# Patient Record
Sex: MALE | Race: BLACK OR AFRICAN AMERICAN | Employment: UNEMPLOYED | URBAN - METROPOLITAN AREA
[De-identification: names, ages, dates, MRNs, and addresses within clinical notes are randomized per-mention and may not be internally consistent; named-entity substitution may affect disease eponyms.]

---

## 2019-04-01 ASSESSMENT — ENCOUNTER SYMPTOMS
ABDOMINAL PAIN: 0
SINUS PAIN: 0
BLOOD IN STOOL: 0
VOMITING: 0
CONSTIPATION: 0
SHORTNESS OF BREATH: 0
WHEEZING: 0
DIARRHEA: 0
SORE THROAT: 0
NAUSEA: 0
BACK PAIN: 0
COUGH: 0

## 2019-04-01 NOTE — PROGRESS NOTES
New Patient Office Visit  4/4/2019    Subjective:  Chief Complaint   Patient presents with   Sarah Paredes Doctor     Was seeing Dr. Shyam Curran      HPI:  Delores Loya is a 62 y.o. male who presents to the clinic today to establish care. HTN- Taking lisinopril 20 mg daily without side effects. Started with this medication 2 years ago. Asymptomatic. No chest pain, palpitations, shortness of breath, trouble breathing, lightheadedness, dizziness or blurred vision. Prediabetes- pt reports he has never been on medications for this. He states that he enjoys sweets. Reports he had a colonoscopy 10/18/17 years ago with 8 polyps removed. Repeat recommended in 3 years. No acute concerns. 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 He has family in town. 2 children. For fun, he plays with his grandchildren- 5 of them. 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 Review of Systems   Constitutional: Negative for chills, fatigue, fever and unexpected weight change. HENT: Negative for congestion, postnasal drip, sinus pain, sore throat and tinnitus. Respiratory: Negative for cough, shortness of breath and wheezing. Cardiovascular: Negative for chest pain, palpitations and leg swelling. Gastrointestinal: Negative for abdominal pain, blood in stool, constipation, diarrhea, nausea and vomiting. Genitourinary: Negative for dysuria, frequency, hematuria and urgency. Musculoskeletal: Negative for back pain, gait problem, myalgias and neck pain. Skin: Negative for pallor and rash. Neurological: Negative for dizziness, weakness, light-headedness, numbness and headaches. Psychiatric/Behavioral: Negative for behavioral problems, confusion, sleep disturbance and suicidal ideas. The patient is not nervous/anxious.       No Known Allergies     Family History   Problem Relation Age of Onset    High Blood Pressure Mother     High Blood Pressure Father     High Blood Pressure Sister     High Blood Pressure Sister     Heart Attack Neg Hx     Stroke Neg Hx     Prostate Cancer Neg Hx      Social History     Socioeconomic History    Marital status: Single     Spouse name: Not on file    Number of children: Not on file    Years of education: Not on file    Highest education level: Not on file   Occupational History    Not on file   Social Needs    Financial resource strain: Not on file    Food insecurity:     Worry: Not on file     Inability: Not on file    Transportation needs:     Medical: Not on file     Non-medical: Not on file   Tobacco Use    Smoking status: Never Smoker    Smokeless tobacco: Never Used   Substance and Sexual Activity    Alcohol use: Not Currently     Comment: has not drank in 3 years- before that, social    Drug use: Yes     Types: Marijuana     Comment: 2x/day    Sexual activity: Not on file     Comment: single    Lifestyle    Physical activity:     Days per week: Not on file     Minutes per session: Not on file    Stress: Not on file   Relationships    Social connections:     Talks on phone: Not on file     Gets together: Not on file     Attends Alevism service: Not on file     Active member of club or organization: Not on file     Attends meetings of clubs or organizations: Not on file     Relationship status: Not on file    Intimate partner violence:     Fear of current or ex partner: Not on file     Emotionally abused: Not on file     Physically abused: Not on file     Forced sexual activity: Not on file   Other Topics Concern    Not on file   Social History Narrative    He is working at a plant in [employer]. He has family in town. 2 children. For fun, he plays with his grandchildren- 5 of them.      Past Medical History:   Diagnosis Date    Hypertension     Prediabetes      Current Outpatient Medications   Medication Sig Dispense Refill    lisinopril (PRINIVIL;ZESTRIL) 20 MG tablet TK 1 T PO QD  4     No current facility-administered hypertension  -     The patient reports a history of hypertension. He is taking lisinopril 20 mg by mouth daily without side effects. Asymptomatic. No chest pain, palpitations, shortness of breath, trouble breathing, lightheadedness, dizziness or blurred vision.  - Patient denies the need for refill today. - CBC Auto Differential; Future  -     COMPREHENSIVE METABOLIC PANEL; Future    Prediabetes  -    Pt reports he has never been on medications for prediabetes. Asymptomatic.   - Lifestyle modifications such as exercise, weight loss and healthy diet encouraged and reviewed with the pt. -  Will re-evaluate today. - CBC Auto Differential; Future  -     COMPREHENSIVE METABOLIC PANEL; Future  -     HEMOGLOBIN A1C; Future    Screening for HIV (human immunodeficiency virus)  -     HIV Screen; Future    Need for hepatitis C screening test  -     HEPATITIS C ANTIBODY; Future    Screening, lipid  -     Lipid, Fasting; Future    Signed release for immunizations. Return in about 3 months (around 7/4/2019) for BP/prediabetes f/u , or sooner if needed. Pt will call if symptoms worsen or fail to improve. All questions answered. Pt states no further questions or concerns at this time.    Electronically signed by: Nika Rodriguez 04/04/19

## 2019-04-04 ENCOUNTER — OFFICE VISIT (OUTPATIENT)
Dept: INTERNAL MEDICINE CLINIC | Age: 59
End: 2019-04-04
Payer: COMMERCIAL

## 2019-04-04 VITALS
SYSTOLIC BLOOD PRESSURE: 128 MMHG | BODY MASS INDEX: 30.86 KG/M2 | HEIGHT: 66 IN | DIASTOLIC BLOOD PRESSURE: 86 MMHG | WEIGHT: 192 LBS | HEART RATE: 80 BPM

## 2019-04-04 DIAGNOSIS — Z11.59 NEED FOR HEPATITIS C SCREENING TEST: ICD-10-CM

## 2019-04-04 DIAGNOSIS — Z11.4 SCREENING FOR HIV (HUMAN IMMUNODEFICIENCY VIRUS): ICD-10-CM

## 2019-04-04 DIAGNOSIS — Z76.89 ENCOUNTER TO ESTABLISH CARE WITH NEW DOCTOR: Primary | ICD-10-CM

## 2019-04-04 DIAGNOSIS — R73.03 PREDIABETES: ICD-10-CM

## 2019-04-04 DIAGNOSIS — I10 ESSENTIAL HYPERTENSION: ICD-10-CM

## 2019-04-04 DIAGNOSIS — Z13.220 SCREENING, LIPID: ICD-10-CM

## 2019-04-04 PROCEDURE — 99386 PREV VISIT NEW AGE 40-64: CPT | Performed by: NURSE PRACTITIONER

## 2019-04-04 RX ORDER — LISINOPRIL 20 MG/1
TABLET ORAL
Refills: 4 | COMMUNITY
Start: 2019-03-05 | End: 2019-06-28 | Stop reason: SDUPTHER

## 2019-04-04 ASSESSMENT — PATIENT HEALTH QUESTIONNAIRE - PHQ9
SUM OF ALL RESPONSES TO PHQ QUESTIONS 1-9: 0
SUM OF ALL RESPONSES TO PHQ QUESTIONS 1-9: 0
SUM OF ALL RESPONSES TO PHQ9 QUESTIONS 1 & 2: 0
2. FEELING DOWN, DEPRESSED OR HOPELESS: 0
1. LITTLE INTEREST OR PLEASURE IN DOING THINGS: 0

## 2019-04-04 NOTE — PATIENT INSTRUCTIONS
Please get your fasting lab work (no food or drink for 10-12 hours prior besides water) completed M-F 830a-430p at our office. [facility] lab has walk-in hours available as well - they are open Saturday 7a-3p - no appointment is needed. We will call with your results.

## 2019-06-27 ASSESSMENT — ENCOUNTER SYMPTOMS
CONSTIPATION: 0
WHEEZING: 0
VOMITING: 0
NAUSEA: 0
DIARRHEA: 0
SHORTNESS OF BREATH: 0
COUGH: 0
ABDOMINAL PAIN: 0

## 2019-06-27 NOTE — PROGRESS NOTES
Office Visit   6/28/2019    Subjective:  Chief Complaint   Patient presents with    Hypertension     HPI:  Clarine Lesch is a 62 y.o. male who presents to the clinic today for follow up. HTN- Taking lisinopril 20 mg daily without side effects. Does not take his BP at home. Asymptomatic. No chest pain, palpitations, shortness of breath, trouble breathing, lightheadedness, dizziness or blurred vision.     Prediabetes- pt reports he has never been on medications for this. He states that he enjoys sweets. He states a poor diet- enjoys cookies, pop and ice cream. Blood work was never completed by the pt since last visit. Will evaluate with POCT A1C today. His daughter has 5 sons and is pregnant with quadruplets. Review of Systems   Constitutional: Negative for chills, fatigue, fever and unexpected weight change. Eyes: Negative for visual disturbance. Respiratory: Negative for cough, shortness of breath and wheezing. Cardiovascular: Negative for chest pain, palpitations and leg swelling. Gastrointestinal: Negative for abdominal pain, constipation, diarrhea, nausea and vomiting. Skin: Negative for pallor and rash. Neurological: Negative for dizziness, weakness, light-headedness, numbness and headaches. No Known Allergies    Current Outpatient Rx   Medication Sig Dispense Refill    lisinopril (PRINIVIL;ZESTRIL) 20 MG tablet Take one tablet by mouth daily 90 tablet 0     Patient Active Problem List   Diagnosis    Hypertension    Prediabetes     Wt Readings from Last 3 Encounters:   06/28/19 192 lb (87.1 kg)   04/04/19 192 lb (87.1 kg)     BP Readings from Last 3 Encounters:   06/28/19 125/84   04/04/19 128/86     The ASCVD Risk score (Isa Quick., et al., 2013) failed to calculate for the following reasons:    Cannot find a previous HDL lab    Cannot find a previous total cholesterol lab   ^ordered.     Objective/Physical Exam:  /84   Pulse 83   Ht 5' 6\" (1.676 m)   Wt 192 lb (87.1 kg) agreeable to the plan. Return in about 3 months (around 9/28/2019) for BP f/u, or sooner if needed. Pt will call if symptoms worsen or fail to improve. All questions answered. Pt states no further questions or concerns at this time.    Electronically signed by: Justine Nicolas 06/28/19

## 2019-06-28 ENCOUNTER — OFFICE VISIT (OUTPATIENT)
Dept: INTERNAL MEDICINE CLINIC | Age: 59
End: 2019-06-28
Payer: COMMERCIAL

## 2019-06-28 VITALS
SYSTOLIC BLOOD PRESSURE: 125 MMHG | DIASTOLIC BLOOD PRESSURE: 84 MMHG | HEART RATE: 83 BPM | WEIGHT: 192 LBS | HEIGHT: 66 IN | BODY MASS INDEX: 30.86 KG/M2

## 2019-06-28 DIAGNOSIS — I10 ESSENTIAL HYPERTENSION: Primary | ICD-10-CM

## 2019-06-28 DIAGNOSIS — R73.03 PREDIABETES: ICD-10-CM

## 2019-06-28 LAB — HBA1C MFR BLD: 5.6 %

## 2019-06-28 PROCEDURE — 3017F COLORECTAL CA SCREEN DOC REV: CPT | Performed by: NURSE PRACTITIONER

## 2019-06-28 PROCEDURE — 83036 HEMOGLOBIN GLYCOSYLATED A1C: CPT | Performed by: NURSE PRACTITIONER

## 2019-06-28 PROCEDURE — G8427 DOCREV CUR MEDS BY ELIG CLIN: HCPCS | Performed by: NURSE PRACTITIONER

## 2019-06-28 PROCEDURE — G8417 CALC BMI ABV UP PARAM F/U: HCPCS | Performed by: NURSE PRACTITIONER

## 2019-06-28 PROCEDURE — 99213 OFFICE O/P EST LOW 20 MIN: CPT | Performed by: NURSE PRACTITIONER

## 2019-06-28 PROCEDURE — 1036F TOBACCO NON-USER: CPT | Performed by: NURSE PRACTITIONER

## 2019-06-28 RX ORDER — LISINOPRIL 20 MG/1
TABLET ORAL
Qty: 90 TABLET | Refills: 0 | Status: SHIPPED | OUTPATIENT
Start: 2019-06-28 | End: 2019-11-14 | Stop reason: SDUPTHER

## 2019-06-28 NOTE — PATIENT INSTRUCTIONS
Please get your fasting lab work (no food or drink for 10-12 hours prior besides water) completed M-F 830a-430p at our office. [facility] lab has walk-in hours available as well - they are open Saturday 7a-3p - no appointment is needed. We will call with your results.

## 2019-11-14 ENCOUNTER — OFFICE VISIT (OUTPATIENT)
Dept: INTERNAL MEDICINE CLINIC | Age: 59
End: 2019-11-14
Payer: COMMERCIAL

## 2019-11-14 VITALS
SYSTOLIC BLOOD PRESSURE: 128 MMHG | BODY MASS INDEX: 30.51 KG/M2 | HEART RATE: 84 BPM | WEIGHT: 189 LBS | OXYGEN SATURATION: 98 % | TEMPERATURE: 98.3 F | DIASTOLIC BLOOD PRESSURE: 82 MMHG

## 2019-11-14 DIAGNOSIS — S79.912A HIP INJURY, LEFT, INITIAL ENCOUNTER: ICD-10-CM

## 2019-11-14 DIAGNOSIS — Z13.220 SCREENING, LIPID: ICD-10-CM

## 2019-11-14 DIAGNOSIS — Z23 NEED FOR INFLUENZA VACCINATION: ICD-10-CM

## 2019-11-14 DIAGNOSIS — I10 ESSENTIAL HYPERTENSION: Primary | ICD-10-CM

## 2019-11-14 DIAGNOSIS — Z11.59 NEED FOR HEPATITIS C SCREENING TEST: ICD-10-CM

## 2019-11-14 DIAGNOSIS — Z11.4 SCREENING FOR HIV (HUMAN IMMUNODEFICIENCY VIRUS): ICD-10-CM

## 2019-11-14 DIAGNOSIS — R73.03 PREDIABETES: ICD-10-CM

## 2019-11-14 PROCEDURE — G8427 DOCREV CUR MEDS BY ELIG CLIN: HCPCS | Performed by: NURSE PRACTITIONER

## 2019-11-14 PROCEDURE — 90471 IMMUNIZATION ADMIN: CPT | Performed by: NURSE PRACTITIONER

## 2019-11-14 PROCEDURE — G8417 CALC BMI ABV UP PARAM F/U: HCPCS | Performed by: NURSE PRACTITIONER

## 2019-11-14 PROCEDURE — 99213 OFFICE O/P EST LOW 20 MIN: CPT | Performed by: NURSE PRACTITIONER

## 2019-11-14 PROCEDURE — 3017F COLORECTAL CA SCREEN DOC REV: CPT | Performed by: NURSE PRACTITIONER

## 2019-11-14 PROCEDURE — 90756 CCIIV4 VACC ABX FREE IM: CPT | Performed by: NURSE PRACTITIONER

## 2019-11-14 PROCEDURE — 1036F TOBACCO NON-USER: CPT | Performed by: NURSE PRACTITIONER

## 2019-11-14 PROCEDURE — G8482 FLU IMMUNIZE ORDER/ADMIN: HCPCS | Performed by: NURSE PRACTITIONER

## 2019-11-14 RX ORDER — LISINOPRIL 20 MG/1
TABLET ORAL
Qty: 90 TABLET | Refills: 0 | Status: SHIPPED | OUTPATIENT
Start: 2019-11-14 | End: 2020-02-21 | Stop reason: SDUPTHER

## 2019-11-14 ASSESSMENT — ENCOUNTER SYMPTOMS
VOMITING: 0
SHORTNESS OF BREATH: 0
WHEEZING: 0
COUGH: 0
ABDOMINAL PAIN: 0
DIARRHEA: 0
NAUSEA: 0
CONSTIPATION: 0

## 2019-11-15 ENCOUNTER — HOSPITAL ENCOUNTER (OUTPATIENT)
Dept: GENERAL RADIOLOGY | Age: 59
Discharge: HOME | End: 2019-11-15
Payer: COMMERCIAL

## 2019-11-15 ENCOUNTER — HOSPITAL ENCOUNTER (OUTPATIENT)
Age: 59
Discharge: HOME | End: 2019-11-15
Payer: COMMERCIAL

## 2019-11-15 DIAGNOSIS — S79.912A HIP INJURY, LEFT, INITIAL ENCOUNTER: ICD-10-CM

## 2019-11-15 PROCEDURE — 73502 X-RAY EXAM HIP UNI 2-3 VIEWS: CPT

## 2019-12-05 ENCOUNTER — TELEPHONE (OUTPATIENT)
Dept: INTERNAL MEDICINE CLINIC | Age: 59
End: 2019-12-05

## 2020-02-24 RX ORDER — LISINOPRIL 20 MG/1
TABLET ORAL
Qty: 30 TABLET | Refills: 0 | Status: SHIPPED | OUTPATIENT
Start: 2020-02-24 | End: 2020-06-12 | Stop reason: SDUPTHER

## 2020-02-24 NOTE — TELEPHONE ENCOUNTER
Patient requesting a medication refill. (patients insurance is no longer accepted by [facility])  Medication: lisinopril (PRINIVIL;ZESTRIL) 20 MG tablet   Pharmacy: [facility] , [address]  Last office visit: 11/14/2019  Next office visit: 3/13/2020

## 2020-03-05 ASSESSMENT — ENCOUNTER SYMPTOMS
COUGH: 0
VOMITING: 0
CONSTIPATION: 0
SHORTNESS OF BREATH: 0
ABDOMINAL PAIN: 0
DIARRHEA: 0
NAUSEA: 0
WHEEZING: 0

## 2020-03-13 ENCOUNTER — OFFICE VISIT (OUTPATIENT)
Dept: INTERNAL MEDICINE CLINIC | Age: 60
End: 2020-03-13
Payer: COMMERCIAL

## 2020-03-13 VITALS
SYSTOLIC BLOOD PRESSURE: 132 MMHG | OXYGEN SATURATION: 98 % | DIASTOLIC BLOOD PRESSURE: 84 MMHG | WEIGHT: 188 LBS | BODY MASS INDEX: 30.34 KG/M2 | HEART RATE: 87 BPM

## 2020-03-13 PROCEDURE — 3017F COLORECTAL CA SCREEN DOC REV: CPT | Performed by: NURSE PRACTITIONER

## 2020-03-13 PROCEDURE — 1036F TOBACCO NON-USER: CPT | Performed by: NURSE PRACTITIONER

## 2020-03-13 PROCEDURE — G8482 FLU IMMUNIZE ORDER/ADMIN: HCPCS | Performed by: NURSE PRACTITIONER

## 2020-03-13 PROCEDURE — G8427 DOCREV CUR MEDS BY ELIG CLIN: HCPCS | Performed by: NURSE PRACTITIONER

## 2020-03-13 PROCEDURE — 99213 OFFICE O/P EST LOW 20 MIN: CPT | Performed by: NURSE PRACTITIONER

## 2020-03-13 PROCEDURE — G8417 CALC BMI ABV UP PARAM F/U: HCPCS | Performed by: NURSE PRACTITIONER

## 2020-03-13 RX ORDER — LISINOPRIL 20 MG/1
TABLET ORAL
Qty: 30 TABLET | Refills: 0 | Status: CANCELLED | OUTPATIENT
Start: 2020-03-13

## 2020-03-13 SDOH — ECONOMIC STABILITY: TRANSPORTATION INSECURITY
IN THE PAST 12 MONTHS, HAS THE LACK OF TRANSPORTATION KEPT YOU FROM MEDICAL APPOINTMENTS OR FROM GETTING MEDICATIONS?: NO

## 2020-03-13 SDOH — ECONOMIC STABILITY: FOOD INSECURITY: WITHIN THE PAST 12 MONTHS, YOU WORRIED THAT YOUR FOOD WOULD RUN OUT BEFORE YOU GOT MONEY TO BUY MORE.: NEVER TRUE

## 2020-03-13 SDOH — ECONOMIC STABILITY: INCOME INSECURITY: HOW HARD IS IT FOR YOU TO PAY FOR THE VERY BASICS LIKE FOOD, HOUSING, MEDICAL CARE, AND HEATING?: NOT HARD AT ALL

## 2020-03-13 SDOH — ECONOMIC STABILITY: TRANSPORTATION INSECURITY
IN THE PAST 12 MONTHS, HAS LACK OF TRANSPORTATION KEPT YOU FROM MEETINGS, WORK, OR FROM GETTING THINGS NEEDED FOR DAILY LIVING?: NO

## 2020-03-13 SDOH — ECONOMIC STABILITY: FOOD INSECURITY: WITHIN THE PAST 12 MONTHS, THE FOOD YOU BOUGHT JUST DIDN'T LAST AND YOU DIDN'T HAVE MONEY TO GET MORE.: NEVER TRUE

## 2020-03-13 ASSESSMENT — PATIENT HEALTH QUESTIONNAIRE - PHQ9
SUM OF ALL RESPONSES TO PHQ QUESTIONS 1-9: 0
2. FEELING DOWN, DEPRESSED OR HOPELESS: 0
SUM OF ALL RESPONSES TO PHQ9 QUESTIONS 1 & 2: 0
SUM OF ALL RESPONSES TO PHQ QUESTIONS 1-9: 0
1. LITTLE INTEREST OR PLEASURE IN DOING THINGS: 0

## 2020-05-18 ENCOUNTER — TELEPHONE (OUTPATIENT)
Dept: INTERNAL MEDICINE CLINIC | Age: 60
End: 2020-05-18

## 2020-05-26 ENCOUNTER — HOSPITAL ENCOUNTER (OUTPATIENT)
Dept: PHYSICAL THERAPY | Age: 60
Setting detail: THERAPIES SERIES
Discharge: HOME | End: 2020-05-26
Payer: COMMERCIAL

## 2020-05-26 PROCEDURE — 97161 PT EVAL LOW COMPLEX 20 MIN: CPT

## 2020-05-26 PROCEDURE — G0283 ELEC STIM OTHER THAN WOUND: HCPCS

## 2020-05-26 PROCEDURE — 97110 THERAPEUTIC EXERCISES: CPT

## 2020-05-26 NOTE — FLOWSHEET NOTE
Program:  Access Code: [email or web address]   URL: [email or web address]   Date: 05/26/2020   Prepared by: Tyler Horta     Exercises   Supine Bridge - 10 reps - 3 sets - 2x daily - 7x weekly   Clamshell with Resistance - 10 reps - 3 sets - 2x daily - 7x weekly   Squat - 10 reps - 3 sets - 2x daily - 7x weekly   Supine Active Straight Leg Raise - 10 reps - 3 sets - 2x daily - 7x weekly   Sidelying Hip Abduction - 10 reps - 3 sets - 2x daily - 7x weekly       Therapeutic Exercise and NMR EXR  [] (08972) Provided verbal/tactile cueing for activities related to strengthening, flexibility, endurance, ROM for improvements in LE, proximal hip, and core control with self care, mobility, lifting, ambulation.  [] (53577) Provided verbal/tactile cueing for activities related to improving balance, coordination, kinesthetic sense, posture, motor skill, proprioception  to assist with LE, proximal hip, and core control in self care, mobility, lifting, ambulation and eccentric single leg control.   [] (20348) Therapist is in constant attendance of 2 or more patients providing skilled therapy interventions, but not providing any significant amount of measurable one-on-one time to either patient, for improvements in LE, proximal hip, and core control in self care, mobility, lifting, ambulation and eccentric single leg control.      NMR and Therapeutic Activities:    [] (68244 or 84703) Provided verbal/tactile cueing for activities related to improving balance, coordination, kinesthetic sense, posture, motor skill, proprioception and motor activation to allow for proper function of core, proximal hip and LE with self care and ADLs  [] (73460) Gait Re-education- Provided training and instruction to the patient for proper LE, core and proximal hip recruitment and positioning and eccentric body weight control with ambulation re-education including up and down stairs     Home Exercise Program:    [x] (75497) Reviewed/Progressed HEP activities related to strengthening, flexibility, endurance, ROM of core, proximal hip and LE for functional self-care, mobility, lifting and ambulation/stair navigation   [] (29249)Reviewed/Progressed HEP activities related to improving balance, coordination, kinesthetic sense, posture, motor skill, proprioception of core, proximal hip and LE for self care, mobility, lifting, and ambulation/stair navigation      Manual Treatments:  PROM / STM / Oscillations-Mobs:  G-I, II, III, IV (PA's, Inf., Post.)  [] (10678) Provided manual therapy to mobilize LE, proximal hip and/or LS spine soft tissue/joints for the purpose of modulating pain, promoting relaxation,  increasing ROM, reducing/eliminating soft tissue swelling/inflammation/restriction, improving soft tissue extensibility and allowing for proper ROM for normal function with self care, mobility, lifting and ambulation. Modalities:  [x] (68174) Vasopneumatic compression: Utilized vasopneumatic compression to decrease edema / swelling for the purpose of improving mobility and quad tone / recruitment which will allow for increased overall function including but not limited to self-care, transfers, ambulation, and ascending / descending stairs. Modalities: 5/26  IFC ES to L hip for 15 min    Charges:  Timed Code Treatment Minutes: 25   Total Treatment Minutes: 70     [x] EVAL - LOW (81892)   [] EVAL - MOD (07261)  [] EVAL - HIGH (72012)  [] RE-EVAL (02553)  [x] ES(51985) x 2     [] Ionto  [] NMR (47342) x      [] Vaso  [] Manual (87895) x      [] Ultrasound  [] TA x       [] Mech Traction (90409)  [] Aquatic Therapy x     [x] ES (un) (39392):   [] Home Management Training x [] ES(attended) (28086)   [] Group:     [] Other:     GOALS:  Patient stated goal: to walk with no pain   []? Progressing: []? Met: []? Not Met: []? Adjusted     Therapist goals for Patient:   Short Term Goals: To be achieved in: 2 weeks  1.  Independent in HEP and progression per patient tolerance, in order to prevent re-injury. []? Progressing: []? Met: []? Not Met: []? Adjusted  2. Patient will have a decrease in pain to 3/10 or below consistently to facilitate improvement in movement, function, and ADLs as indicated by Functional Deficits. []? Progressing: []? Met: []? Not Met: []? Adjusted     Long Term Goals: To be achieved in: 6 weeks  1. Disability index score of 25% or less for the LEFS to assist with reaching prior level of function. []? Progressing: []? Met: []? Not Met: []? Adjusted  3. Patient will demonstrate an increase in Strength of LLE to at least 4+ grossly as well as good proximal hip strength and control to allow for proper functional mobility as indicated by patients Functional Deficits. []? Progressing: []? Met: []? Not Met: []? Adjusted  4. Patient will return to functional activities including walking with no AD without increased symptoms or restriction. []? Progressing: []? Met: []? Not Met: []? Adjusted      Overall Progression Towards Functional goals/ Treatment Progress Update:  [] Patient is progressing as expected towards functional goals listed. [] Progression is slowed due to complexities/Impairments listed. [] Progression has been slowed due to co-morbidities.   [x] Plan just implemented, too soon to assess goals progression <30days   [] Goals require adjustment due to lack of progress  [] Patient is not progressing as expected and requires additional follow up with physician  [] Other    Persisting Functional Limitations/Impairments:  [x]Sitting [x]Standing   [x]Walking [x]Stairs   [x]Transfers [x]ADLs   [x]Squatting/bending [x]Kneeling  []Housework []Job related tasks  []Driving [x]Sports/Recreation   []Sleeping []Other:    ASSESSMENT:  See eval  Treatment/Activity Tolerance:  [x] Pt able to complete treatment [] Patient limited by fatique  [] Patient limited by pain  [] Patient limited by other medical complications  [] Other:     Prognosis: [x] Good [] Fair  []

## 2020-05-26 NOTE — PLAN OF CARE
Physical Therapy                                                           [facility], [address] [place] [address] [place], [address]  Phone: [phone]   Fax: [phone]                                                       Physical Therapy Certification    Dear Referring Practitioner: CHERYL Mckinley CNP,    We had the pleasure of evaluating the following patient for physical therapy services at [address] [place] [address]. A summary of our findings can be found in the initial assessment below. This includes our plan of care. If you have any questions or concerns regarding these findings, please do not hesitate to contact me at the office phone number checked above. Thank you for the referral.       Physician Signature:_______________________________Date:__________________  By signing above (or electronic signature), therapists plan is approved by physician      Patient: Monica Bangura   : 1960   MRN: [id number]  Referring Physician: Referring Practitioner: CHERYL Mckinley CNP      Evaluation Date: 2020      Medical Diagnosis Information:  Diagnosis: M25.552 (ICD-10-CM) - Hip pain, left   Treatment Diagnosis: L hip pain, weakness, decreased ROM                                          Insurance information: PT Insurance Information: [employer]      Precautions/ Contra-indications: n/a  Latex Allergy:  [x]NO      []YES  Preferred Language for Healthcare:   [x]English       []other:    SUBJECTIVE: Pt states ever since fall in 2019 at work where he fell on his L hip, pain and function of hip has been getting worse. Pt does not feel steady on feet at times. Pain can fluctuate  depending on the day. Pt states it can be worse and stiff in the morning and takes him awhile to get loosened up. Patient enjoys walking and playing with grandkids.  Patient describes pain as achy and keeps a [facility] with him just in case he needs it. Relevant Medical History: HTN, prediabetes       Functional Outcome: LEFS raw score = 39; dysfunction = 49%    Pain Scale: 0/10 at rest .. when pt woke up this morning 3/10 . At its worst 8/10   Easing factors: pain meds, rest.   Provocative factors: going from sitting to standing position getting ready to walk. Type: []Constant   [x]Intermittent  []Radiating []Localized []Other:     Numbness/Tingling: pt reports no numbness or tingling. Occupation/School: currently not working, was going to starting looking for another job, then pandemic happened. Living Status/Prior Level of Function:Prior to this injury / incident, pt was independent with ADLs and IADLs. Pt uses SPC on \"bad days\" . Active        OBJECTIVE:   Palpation: TTP over L greater trochanter     Functional Mobility/Transfers: Ind    Posture: WNL    Bandages/Dressings/Incisions: n/a    Gait: (include devices/WB status) .  With no AD, slight compensations to L hip during loading response    Dermatomes Normal Abnormal Comments   inguinal area (L1)       anterior mid-thigh (L2) x     distal ant thigh/med knee (L3) x     medial lower leg and foot (L4) x     lateral lower leg and foot (L5) x     posterior calf (S1)      medial calcaneus (S2)                                                   PROM AROM    L R L R   Hip Flexion [facility]     Hip Abduction       Hip ER       Hip IR       Knee Flexion WNL WNL     Knee Extension WNL WNL     Dorsiflexion        Plantarflexion        Inversion        Eversion            Strength (0-5) / Myotomes Left Right   Hip Flexion - supine     Hip Flexion - seated (L1-2) 4 4+   Hip Abduction 4- 4   Hip Adduction     Hip ER     Hip IR     Quads (L2-4) 4+ 5   Hamstrings 4 4+   Ankle Dorsiflexion (L4-5) 5 5   Ankle Plantarflexion (S1-2)     Ankle Inversion     Ankle Eversion (S1-2)     Great Toe Extension (L5)          Flexibility     Hamstrings (90/90) -10 -10   ITB (Brijesh)     Quads (Ely's)     Hip weeks  1. Disability index score of 25% or less for the LEFS to assist with reaching prior level of function. [] Progressing: [] Met: [] Not Met: [] Adjusted  3. Patient will demonstrate an increase in Strength of LLE to at least 4+ grossly as well as good proximal hip strength and control to allow for proper functional mobility as indicated by patients Functional Deficits. [] Progressing: [] Met: [] Not Met: [] Adjusted  4. Patient will return to functional activities including walking with no AD without increased symptoms or restriction.    [] Progressing: [] Met: [] Not Met: [] Adjusted      Electronically signed by:  Marti Ornelas PT

## 2020-06-02 ENCOUNTER — HOSPITAL ENCOUNTER (OUTPATIENT)
Dept: PHYSICAL THERAPY | Age: 60
Setting detail: THERAPIES SERIES
Discharge: HOME | End: 2020-06-02
Payer: COMMERCIAL

## 2020-06-02 PROCEDURE — G0283 ELEC STIM OTHER THAN WOUND: HCPCS

## 2020-06-02 PROCEDURE — 97140 MANUAL THERAPY 1/> REGIONS: CPT

## 2020-06-02 PROCEDURE — 97110 THERAPEUTIC EXERCISES: CPT

## 2020-06-02 NOTE — FLOWSHEET NOTE
[facility]  Phone: [phone]   Fax: [phone]    Physical Therapy Treatment Note/ Progress Report:     Date:  2020    Patient Name:  Gaurav Moctezuma    :  1960  MRN: [id number]  Restrictions/Precautions:    Medical/Treatment Diagnosis Information:    Diagnosis: Y12.581 (ICD-10-CM) - Hip pain, left    Treatment Diagnosis: L hip pain, weakness, decreased ROM   Insurance/Certification information:    PT Insurance Information: [facility]   Physician Information:    Referring Practitioner: CHERYL Franklin CNP  Plan of care signed (Y/N): [x]  Yes []  No     Date of Patient follow up with Physician:      Progress Report: []  Yes  [x]  No     Date Range for reporting period:  Beginnin20  Endin20    Progress report due (10 Rx/or 30 days whichever is less): 10 tx or 18    Recertification due (POC duration/ or 90 days whichever is less): 20    Visit # Insurance Allowable Auth required? Date Range    30 py []  Yes  [x]  No 20 -      Latex Allergy:  [x]NO      []YES  Preferred Language for Healthcare:   [x]English       []other:    Functional Scale:        Date assessed:  LEFS: raw score = 39; dysfunction = 49%   20    Pain level:  5/10 L hip     SUBJECTIVE:  Pt reports he has been doing HEP and trying to keep active. He had 2-3 good days in a row and today he reports is a bad day. Pain 5/10 in L hip ,achy.      OBJECTIVE:       RESTRICTIONS/PRECAUTIONS: na    Exercises/Interventions:     Therapeutic Exercise (10525)  Resistance / level Sets/sec Reps Notes / Cues   bike  5'      bridges  SLR  Hip abd   Standing squat   SKC  4 way hip - SLR  2 10 LLE   Total gym squats  2 15    Gliders - abd/ext  2 10 B                 Therapeutic Activities (75821)                                   Neuromuscular Re-ed (18376)                            Manual Intervention (84760)       L Leg pull, L HSS, L hip PROM  x15 min Pt. Education:  -pt educated on diagnosis, prognosis and expectations for rehab  -all pt questions were answered    Home Exercise Program:  Access Code: [email or web address]   URL: [email or web address]   Date: 05/26/2020   Prepared by: Mily Mix     Exercises   Supine Bridge - 10 reps - 3 sets - 2x daily - 7x weekly   Clamshell with Resistance - 10 reps - 3 sets - 2x daily - 7x weekly   Squat - 10 reps - 3 sets - 2x daily - 7x weekly   Supine Active Straight Leg Raise - 10 reps - 3 sets - 2x daily - 7x weekly   Sidelying Hip Abduction - 10 reps - 3 sets - 2x daily - 7x weekly       Therapeutic Exercise and NMR EXR  [x] (60354) Provided verbal/tactile cueing for activities related to strengthening, flexibility, endurance, ROM for improvements in LE, proximal hip, and core control with self care, mobility, lifting, ambulation.  [] (52490) Provided verbal/tactile cueing for activities related to improving balance, coordination, kinesthetic sense, posture, motor skill, proprioception  to assist with LE, proximal hip, and core control in self care, mobility, lifting, ambulation and eccentric single leg control.   [] (38797) Therapist is in constant attendance of 2 or more patients providing skilled therapy interventions, but not providing any significant amount of measurable one-on-one time to either patient, for improvements in LE, proximal hip, and core control in self care, mobility, lifting, ambulation and eccentric single leg control.      NMR and Therapeutic Activities:    [] (28677 or 94950) Provided verbal/tactile cueing for activities related to improving balance, coordination, kinesthetic sense, posture, motor skill, proprioception and motor activation to allow for proper function of core, proximal hip and LE with self care and ADLs  [] (59179) Gait Re-education- Provided training and instruction to the patient for proper LE, core and proximal hip recruitment and positioning and eccentric body weight control with ambulation re-education including up and down stairs     Home Exercise Program:    [x] (95267) Reviewed/Progressed HEP activities related to strengthening, flexibility, endurance, ROM of core, proximal hip and LE for functional self-care, mobility, lifting and ambulation/stair navigation   [] (13293)Reviewed/Progressed HEP activities related to improving balance, coordination, kinesthetic sense, posture, motor skill, proprioception of core, proximal hip and LE for self care, mobility, lifting, and ambulation/stair navigation      Manual Treatments:  PROM / STM / Oscillations-Mobs:  G-I, II, III, IV (PA's, Inf., Post.)  [x] (76612) Provided manual therapy to mobilize LE, proximal hip and/or LS spine soft tissue/joints for the purpose of modulating pain, promoting relaxation,  increasing ROM, reducing/eliminating soft tissue swelling/inflammation/restriction, improving soft tissue extensibility and allowing for proper ROM for normal function with self care, mobility, lifting and ambulation. Modalities:  [x] (71242) Vasopneumatic compression: Utilized vasopneumatic compression to decrease edema / swelling for the purpose of improving mobility and quad tone / recruitment which will allow for increased overall function including but not limited to self-care, transfers, ambulation, and ascending / descending stairs. Modalities: 5/26, 6/2  IFC ES to L hip for 15 min    Charges:  Timed Code Treatment Minutes: 41   Total Treatment Minutes: 58     [] EVAL - LOW (80051)   [] EVAL - MOD (23753)  [] EVAL - HIGH (16162)  [] RE-EVAL (08469)  [x] UY(70167) x 2     [] Ionto  [] NMR (85338) x      [] Vaso  [x] Manual (75782) x 1     [] Ultrasound  [] TA x       [] Mech Traction (59927)  [] Aquatic Therapy x     [x] ES (un) (43834):   [] Home Management Training x [] ES(attended) (43241)   [] Group:     [] Other:     GOALS:  Patient stated goal: to walk with no pain   [x]?  Progressing: []?

## 2020-06-04 ENCOUNTER — HOSPITAL ENCOUNTER (OUTPATIENT)
Dept: PHYSICAL THERAPY | Age: 60
Setting detail: THERAPIES SERIES
Discharge: HOME | End: 2020-06-04
Payer: COMMERCIAL

## 2020-06-04 PROCEDURE — 97140 MANUAL THERAPY 1/> REGIONS: CPT

## 2020-06-04 PROCEDURE — G0283 ELEC STIM OTHER THAN WOUND: HCPCS

## 2020-06-04 PROCEDURE — 97110 THERAPEUTIC EXERCISES: CPT

## 2020-06-04 NOTE — FLOWSHEET NOTE
Manual Intervention (18035)       L Leg pull, lateral hip mob , L hip PROM  x13 min                                              Pt. Education:  -pt educated on diagnosis, prognosis and expectations for rehab  -all pt questions were answered    Home Exercise Program:  Access Code: [email or web address]   URL: [email or web address]   Date: 05/26/2020   Prepared by: Isaias Mota     Exercises   Supine Bridge - 10 reps - 3 sets - 2x daily - 7x weekly   Clamshell with Resistance - 10 reps - 3 sets - 2x daily - 7x weekly   Squat - 10 reps - 3 sets - 2x daily - 7x weekly   Supine Active Straight Leg Raise - 10 reps - 3 sets - 2x daily - 7x weekly   Sidelying Hip Abduction - 10 reps - 3 sets - 2x daily - 7x weekly       Therapeutic Exercise and NMR EXR  [x] (28455) Provided verbal/tactile cueing for activities related to strengthening, flexibility, endurance, ROM for improvements in LE, proximal hip, and core control with self care, mobility, lifting, ambulation.  [] (76909) Provided verbal/tactile cueing for activities related to improving balance, coordination, kinesthetic sense, posture, motor skill, proprioception  to assist with LE, proximal hip, and core control in self care, mobility, lifting, ambulation and eccentric single leg control.   [] (04889) Therapist is in constant attendance of 2 or more patients providing skilled therapy interventions, but not providing any significant amount of measurable one-on-one time to either patient, for improvements in LE, proximal hip, and core control in self care, mobility, lifting, ambulation and eccentric single leg control.      NMR and Therapeutic Activities:    [] (37232 or 90318) Provided verbal/tactile cueing for activities related to improving balance, coordination, kinesthetic sense, posture, motor skill, proprioception and motor activation to allow for proper function of core, proximal hip and LE with self care and ADLs  [] (24505) Gait Re-education- Provided training and [] Group:     [] Other:     GOALS:  Patient stated goal: to walk with no pain   [x]? Progressing: []? Met: []? Not Met: []? Adjusted     Therapist goals for Patient:   Short Term Goals: To be achieved in: 2 weeks  1. Independent in HEP and progression per patient tolerance, in order to prevent re-injury. [x]? Progressing: []? Met: []? Not Met: []? Adjusted  2. Patient will have a decrease in pain to 3/10 or below consistently to facilitate improvement in movement, function, and ADLs as indicated by Functional Deficits. [x]? Progressing: []? Met: []? Not Met: []? Adjusted     Long Term Goals: To be achieved in: 6 weeks  1. Disability index score of 25% or less for the LEFS to assist with reaching prior level of function. [x]? Progressing: []? Met: []? Not Met: []? Adjusted  3. Patient will demonstrate an increase in Strength of LLE to at least 4+ grossly as well as good proximal hip strength and control to allow for proper functional mobility as indicated by patients Functional Deficits. [x]? Progressing: []? Met: []? Not Met: []? Adjusted  4. Patient will return to functional activities including walking with no AD without increased symptoms or restriction. [x]? Progressing: []? Met: []? Not Met: []? Adjusted      Overall Progression Towards Functional goals/ Treatment Progress Update:  [] Patient is progressing as expected towards functional goals listed. [] Progression is slowed due to complexities/Impairments listed. [] Progression has been slowed due to co-morbidities.   [x] Plan just implemented, too soon to assess goals progression <30days   [] Goals require adjustment due to lack of progress  [] Patient is not progressing as expected and requires additional follow up with physician  [] Other    Persisting Functional Limitations/Impairments:  [x]Sitting [x]Standing   [x]Walking [x]Stairs   [x]Transfers [x]ADLs   [x]Squatting/bending [x]Kneeling  []Housework []Job related

## 2020-06-09 ENCOUNTER — HOSPITAL ENCOUNTER (OUTPATIENT)
Dept: PHYSICAL THERAPY | Age: 60
Setting detail: THERAPIES SERIES
Discharge: HOME | End: 2020-06-09
Payer: COMMERCIAL

## 2020-06-09 ENCOUNTER — TELEPHONE (OUTPATIENT)
Dept: INTERNAL MEDICINE CLINIC | Age: 60
End: 2020-06-09

## 2020-06-09 PROCEDURE — 97140 MANUAL THERAPY 1/> REGIONS: CPT

## 2020-06-09 PROCEDURE — 97110 THERAPEUTIC EXERCISES: CPT

## 2020-06-09 PROCEDURE — G0283 ELEC STIM OTHER THAN WOUND: HCPCS

## 2020-06-09 NOTE — FLOWSHEET NOTE
(un) (40644):   [] Home Management Training x [] ES(attended) (74988)   [] Group:     [] Other:     GOALS:  Patient stated goal: to walk with no pain   [x]? Progressing: []? Met: []? Not Met: []? Adjusted     Therapist goals for Patient:   Short Term Goals: To be achieved in: 2 weeks  1. Independent in HEP and progression per patient tolerance, in order to prevent re-injury. [x]? Progressing: []? Met: []? Not Met: []? Adjusted  2. Patient will have a decrease in pain to 3/10 or below consistently to facilitate improvement in movement, function, and ADLs as indicated by Functional Deficits. [x]? Progressing: []? Met: []? Not Met: []? Adjusted     Long Term Goals: To be achieved in: 6 weeks  1. Disability index score of 25% or less for the LEFS to assist with reaching prior level of function. [x]? Progressing: []? Met: []? Not Met: []? Adjusted  3. Patient will demonstrate an increase in Strength of LLE to at least 4+ grossly as well as good proximal hip strength and control to allow for proper functional mobility as indicated by patients Functional Deficits. [x]? Progressing: []? Met: []? Not Met: []? Adjusted  4. Patient will return to functional activities including walking with no AD without increased symptoms or restriction. [x]? Progressing: []? Met: []? Not Met: []? Adjusted      Overall Progression Towards Functional goals/ Treatment Progress Update:  [] Patient is progressing as expected towards functional goals listed. [] Progression is slowed due to complexities/Impairments listed. [] Progression has been slowed due to co-morbidities.   [x] Plan just implemented, too soon to assess goals progression <30days   [] Goals require adjustment due to lack of progress  [] Patient is not progressing as expected and requires additional follow up with physician  [] Other    Persisting Functional

## 2020-06-11 ENCOUNTER — HOSPITAL ENCOUNTER (OUTPATIENT)
Dept: PHYSICAL THERAPY | Age: 60
Setting detail: THERAPIES SERIES
Discharge: HOME | End: 2020-06-11
Payer: COMMERCIAL

## 2020-06-11 PROCEDURE — 97110 THERAPEUTIC EXERCISES: CPT

## 2020-06-11 PROCEDURE — G0283 ELEC STIM OTHER THAN WOUND: HCPCS

## 2020-06-11 PROCEDURE — 97140 MANUAL THERAPY 1/> REGIONS: CPT

## 2020-06-11 NOTE — FLOWSHEET NOTE
(43006)                                   Neuromuscular Re-ed (82500)                            Manual Intervention ([phone])       L Leg pull, lateral hip mob , L hip PROM  x13 min                                              Pt. Education:  -pt educated on diagnosis, prognosis and expectations for rehab  -all pt questions were answered    Home Exercise Program:  Access Code: [email or web address]   URL: [email or web address]   Date: 05/26/2020   Prepared by: Carolee Capellan     Exercises   Supine Bridge - 10 reps - 3 sets - 2x daily - 7x weekly   Clamshell with Resistance - 10 reps - 3 sets - 2x daily - 7x weekly   Squat - 10 reps - 3 sets - 2x daily - 7x weekly   Supine Active Straight Leg Raise - 10 reps - 3 sets - 2x daily - 7x weekly   Sidelying Hip Abduction - 10 reps - 3 sets - 2x daily - 7x weekly       Therapeutic Exercise and NMR EXR  [x] (60215) Provided verbal/tactile cueing for activities related to strengthening, flexibility, endurance, ROM for improvements in LE, proximal hip, and core control with self care, mobility, lifting, ambulation.  [] (62614) Provided verbal/tactile cueing for activities related to improving balance, coordination, kinesthetic sense, posture, motor skill, proprioception  to assist with LE, proximal hip, and core control in self care, mobility, lifting, ambulation and eccentric single leg control.   [] (28605) Therapist is in constant attendance of 2 or more patients providing skilled therapy interventions, but not providing any significant amount of measurable one-on-one time to either patient, for improvements in LE, proximal hip, and core control in self care, mobility, lifting, ambulation and eccentric single leg control.      NMR and Therapeutic Activities:    [] (14520 or 12426) Provided verbal/tactile cueing for activities related to improving balance, coordination, kinesthetic sense, posture, motor skill, proprioception and motor activation to allow for proper function of core, proximal hip and LE with self care and ADLs  [] (73006) Gait Re-education- Provided training and instruction to the patient for proper LE, core and proximal hip recruitment and positioning and eccentric body weight control with ambulation re-education including up and down stairs     Home Exercise Program:    [x] (65561) Reviewed/Progressed HEP activities related to strengthening, flexibility, endurance, ROM of core, proximal hip and LE for functional self-care, mobility, lifting and ambulation/stair navigation   [] (37765)Reviewed/Progressed HEP activities related to improving balance, coordination, kinesthetic sense, posture, motor skill, proprioception of core, proximal hip and LE for self care, mobility, lifting, and ambulation/stair navigation      Manual Treatments:  PROM / STM / Oscillations-Mobs:  G-I, II, III, IV (PA's, Inf., Post.)  [x] (26631) Provided manual therapy to mobilize LE, proximal hip and/or LS spine soft tissue/joints for the purpose of modulating pain, promoting relaxation,  increasing ROM, reducing/eliminating soft tissue swelling/inflammation/restriction, improving soft tissue extensibility and allowing for proper ROM for normal function with self care, mobility, lifting and ambulation. Modalities:  [x] (67845) Vasopneumatic compression: Utilized vasopneumatic compression to decrease edema / swelling for the purpose of improving mobility and quad tone / recruitment which will allow for increased overall function including but not limited to self-care, transfers, ambulation, and ascending / descending stairs.        Modalities: 5/26, 6/2, 6/4, 6/9, 6/11  IFC ES to L hip for 15 min     Charges:  Timed Code Treatment Minutes: 43   Total Treatment Minutes: 58     [] EVAL - LOW (86120)   [] EVAL - MOD (71120)  [] EVAL - HIGH (89956)  [] RE-EVAL (24957)  [x] NE(52498) x 2     [] Ionto  [] NMR (80058) x      [] Vaso  [x] Manual (51710) x 1     [] Ultrasound  [] TA x       [] Mech Traction (31515)  [] Aquatic Therapy x     [x] ES (un) (08293):   [] Home Management Training x [] ES(attended) (86680)   [] Group:     [] Other:     GOALS:  Patient stated goal: to walk with no pain   [x]? Progressing: []? Met: []? Not Met: []? Adjusted     Therapist goals for Patient:   Short Term Goals: To be achieved in: 2 weeks  1. Independent in HEP and progression per patient tolerance, in order to prevent re-injury. [x]? Progressing: []? Met: []? Not Met: []? Adjusted  2. Patient will have a decrease in pain to 3/10 or below consistently to facilitate improvement in movement, function, and ADLs as indicated by Functional Deficits. [x]? Progressing: []? Met: []? Not Met: []? Adjusted     Long Term Goals: To be achieved in: 6 weeks  1. Disability index score of 25% or less for the LEFS to assist with reaching prior level of function. [x]? Progressing: []? Met: []? Not Met: []? Adjusted  3. Patient will demonstrate an increase in Strength of LLE to at least 4+ grossly as well as good proximal hip strength and control to allow for proper functional mobility as indicated by patients Functional Deficits. [x]? Progressing: []? Met: []? Not Met: []? Adjusted  4. Patient will return to functional activities including walking with no AD without increased symptoms or restriction. [x]? Progressing: []? Met: []? Not Met: []? Adjusted      Overall Progression Towards Functional goals/ Treatment Progress Update:  [] Patient is progressing as expected towards functional goals listed. [] Progression is slowed due to complexities/Impairments listed. [] Progression has been slowed due to co-morbidities.   [x] Plan just implemented, too soon to assess goals progression <30days   [] Goals require adjustment due to lack of progress  [] Patient is not progressing as expected and requires additional follow up with physician  [] Other    Persisting Functional

## 2020-06-12 ENCOUNTER — VIRTUAL VISIT (OUTPATIENT)
Dept: INTERNAL MEDICINE CLINIC | Age: 60
End: 2020-06-12
Payer: COMMERCIAL

## 2020-06-12 PROCEDURE — 99213 OFFICE O/P EST LOW 20 MIN: CPT | Performed by: NURSE PRACTITIONER

## 2020-06-12 RX ORDER — LISINOPRIL 20 MG/1
TABLET ORAL
Qty: 30 TABLET | Refills: 0 | Status: SHIPPED | OUTPATIENT
Start: 2020-06-12 | End: 2020-07-07

## 2020-06-16 ENCOUNTER — HOSPITAL ENCOUNTER (OUTPATIENT)
Dept: PHYSICAL THERAPY | Age: 60
Setting detail: THERAPIES SERIES
Discharge: HOME | End: 2020-06-16
Payer: COMMERCIAL

## 2020-06-16 DIAGNOSIS — R73.03 PREDIABETES: ICD-10-CM

## 2020-06-16 DIAGNOSIS — Z11.59 NEED FOR HEPATITIS C SCREENING TEST: ICD-10-CM

## 2020-06-16 DIAGNOSIS — I10 ESSENTIAL HYPERTENSION: ICD-10-CM

## 2020-06-16 LAB
A/G RATIO: 1.8 (ref 1.1–2.2)
ALBUMIN SERPL-MCNC: 4.3 G/DL (ref 3.4–5)
ALP BLD-CCNC: 71 U/L (ref 40–129)
ALT SERPL-CCNC: 9 U/L (ref 10–40)
ANION GAP SERPL CALCULATED.3IONS-SCNC: 11 MMOL/L (ref 3–16)
AST SERPL-CCNC: 11 U/L (ref 15–37)
BILIRUB SERPL-MCNC: 0.8 MG/DL (ref 0–1)
BUN BLDV-MCNC: 5 MG/DL (ref 7–20)
CALCIUM SERPL-MCNC: 9.1 MG/DL (ref 8.3–10.6)
CHLORIDE BLD-SCNC: 103 MMOL/L (ref 99–110)
CO2: 27 MMOL/L (ref 21–32)
CREAT SERPL-MCNC: 0.6 MG/DL (ref 0.9–1.3)
GFR AFRICAN AMERICAN: >60
GFR NON-AFRICAN AMERICAN: >60
GLOBULIN: 2.4 G/DL
GLUCOSE BLD-MCNC: 94 MG/DL (ref 70–99)
HEPATITIS C ANTIBODY INTERPRETATION: NORMAL
POTASSIUM SERPL-SCNC: 4.1 MMOL/L (ref 3.5–5.1)
SODIUM BLD-SCNC: 141 MMOL/L (ref 136–145)
TOTAL PROTEIN: 6.7 G/DL (ref 6.4–8.2)

## 2020-06-16 PROCEDURE — 97140 MANUAL THERAPY 1/> REGIONS: CPT

## 2020-06-16 PROCEDURE — G0283 ELEC STIM OTHER THAN WOUND: HCPCS

## 2020-06-16 PROCEDURE — 97110 THERAPEUTIC EXERCISES: CPT

## 2020-06-16 NOTE — FLOWSHEET NOTE
Lunge on Bosu  2 10 B   Hip iso with SB , Bilateral   1 10 Standing, hip at 90 , 5 sec holds   SB hip flexion  2 10 supine   Therapeutic Activities (20630)                                   Neuromuscular Re-ed (89993)                            Manual Intervention (74602)       L Leg pull, lateral hip mob , L hip PROM  x13 min                                              Pt. Education:  -pt educated on diagnosis, prognosis and expectations for rehab  -all pt questions were answered    Home Exercise Program:  Access Code: [email or web address]   URL: [email or web address]   Date: 05/26/2020   Prepared by: Michael Soni     Exercises   Supine Bridge - 10 reps - 3 sets - 2x daily - 7x weekly   Clamshell with Resistance - 10 reps - 3 sets - 2x daily - 7x weekly   Squat - 10 reps - 3 sets - 2x daily - 7x weekly   Supine Active Straight Leg Raise - 10 reps - 3 sets - 2x daily - 7x weekly   Sidelying Hip Abduction - 10 reps - 3 sets - 2x daily - 7x weekly       Therapeutic Exercise and NMR EXR  [x] (35103) Provided verbal/tactile cueing for activities related to strengthening, flexibility, endurance, ROM for improvements in LE, proximal hip, and core control with self care, mobility, lifting, ambulation.  [] (04522) Provided verbal/tactile cueing for activities related to improving balance, coordination, kinesthetic sense, posture, motor skill, proprioception  to assist with LE, proximal hip, and core control in self care, mobility, lifting, ambulation and eccentric single leg control.   [] (61036) Therapist is in constant attendance of 2 or more patients providing skilled therapy interventions, but not providing any significant amount of measurable one-on-one time to either patient, for improvements in LE, proximal hip, and core control in self care, mobility, lifting, ambulation and eccentric single leg control.      NMR and Therapeutic Activities:    [] (98941 or ) Provided verbal/tactile cueing for activities expected and requires additional follow up with physician  [] Other    Persisting Functional Limitations/Impairments:  [x]Sitting [x]Standing   [x]Walking [x]Stairs   [x]Transfers [x]ADLs   [x]Squatting/bending [x]Kneeling  []Housework []Job related tasks  []Driving [x]Sports/Recreation   []Sleeping []Other:    ASSESSMENT:   Pt tolerated increased reps , sets and resistance today well with little to no increase in symptoms. Pt is progressing well and pain is decreasing. Treatment/Activity Tolerance:  [x] Pt able to complete treatment [] Patient limited by fatique  [] Patient limited by pain  [] Patient limited by other medical complications  [] Other:     Prognosis: [x] Good [] Fair  [] Poor    Patient Requires Follow-up: [x] Yes  [] No      PLAN: See eval. PT 1-2 x / week for 6 weeks. [x] Continue per plan of care [] Alter current plan (see comments)  [] Plan of care initiated [] Hold pending MD visit [] Discharge    Electronically signed by: Phong Childs PT, DPT      Note: If patient does not return for scheduled/ recommended follow up visits, this note will serve as a discharge from care along with most recent update on progress.

## 2020-06-17 LAB
ESTIMATED AVERAGE GLUCOSE: 116.9 MG/DL
HBA1C MFR BLD: 5.7 %

## 2020-06-18 ENCOUNTER — HOSPITAL ENCOUNTER (OUTPATIENT)
Dept: PHYSICAL THERAPY | Age: 60
Setting detail: THERAPIES SERIES
Discharge: HOME | End: 2020-06-18
Payer: COMMERCIAL

## 2020-06-18 NOTE — PROGRESS NOTES
Physical Therapy  Cancellation/No-show Note  Patient Name:  Rhina Redding  :  1960   Date:  2020  Cancelled visits to date: 1  No-shows to date: 0    Patient status for today's appointment patient:  [x]  Cancelled  []  Rescheduled appointment  []  No-show     Reason given by patient:  []  Patient ill  []  Conflicting appointment  []  No transportation    []  Conflict with work  []  No reason given  [x]  Other:     Comments:   Family conflict this morning     Phone call information:   []  Phone call made today to patient at _ time at number provided:      []  Patient answered, conversation as follows:    []  Patient did not answer, message left as follows:  [x]  Phone call not made today    Electronically signed by:  José Pope PT

## 2020-06-23 ENCOUNTER — HOSPITAL ENCOUNTER (OUTPATIENT)
Dept: PHYSICAL THERAPY | Age: 60
Setting detail: THERAPIES SERIES
Discharge: HOME | End: 2020-06-23
Payer: COMMERCIAL

## 2020-06-23 PROCEDURE — 97140 MANUAL THERAPY 1/> REGIONS: CPT

## 2020-06-23 PROCEDURE — G0283 ELEC STIM OTHER THAN WOUND: HCPCS

## 2020-06-23 PROCEDURE — 97110 THERAPEUTIC EXERCISES: CPT

## 2020-06-23 NOTE — FLOWSHEET NOTE
Bosu  B   Hip iso with SB , Bilateral   Standing, hip at 90 , 5 sec holds   TRX: squat, lunge, lat lunge , hip hike, SLS  2 10 Each , B   SB hip flexion  supine   Therapeutic Activities (18429)                                   Neuromuscular Re-ed (55593)                            Manual Intervention (40857)       L Leg pull, lateral hip mob , L hip PROM  x9 min                                              Pt. Education:  -pt educated on diagnosis, prognosis and expectations for rehab  -all pt questions were answered    Home Exercise Program:  Access Code: [email or web address]   URL: [email or web address]   Date: 05/26/2020   Prepared by: Joshua Farmer     Exercises   Supine Bridge - 10 reps - 3 sets - 2x daily - 7x weekly   Clamshell with Resistance - 10 reps - 3 sets - 2x daily - 7x weekly   Squat - 10 reps - 3 sets - 2x daily - 7x weekly   Supine Active Straight Leg Raise - 10 reps - 3 sets - 2x daily - 7x weekly   Sidelying Hip Abduction - 10 reps - 3 sets - 2x daily - 7x weekly       Therapeutic Exercise and NMR EXR  [x] (43162) Provided verbal/tactile cueing for activities related to strengthening, flexibility, endurance, ROM for improvements in LE, proximal hip, and core control with self care, mobility, lifting, ambulation.  [] (57833) Provided verbal/tactile cueing for activities related to improving balance, coordination, kinesthetic sense, posture, motor skill, proprioception  to assist with LE, proximal hip, and core control in self care, mobility, lifting, ambulation and eccentric single leg control.   [] (27743) Therapist is in constant attendance of 2 or more patients providing skilled therapy interventions, but not providing any significant amount of measurable one-on-one time to either patient, for improvements in LE, proximal hip, and core control in self care, mobility, lifting, ambulation and eccentric single leg control.      NMR and Therapeutic Activities:    [] (52715 or 52301) Provided verbal/tactile

## 2020-06-25 ENCOUNTER — HOSPITAL ENCOUNTER (OUTPATIENT)
Dept: PHYSICAL THERAPY | Age: 60
Setting detail: THERAPIES SERIES
Discharge: HOME | End: 2020-06-25
Payer: COMMERCIAL

## 2020-06-25 PROCEDURE — 97140 MANUAL THERAPY 1/> REGIONS: CPT

## 2020-06-25 PROCEDURE — 97530 THERAPEUTIC ACTIVITIES: CPT

## 2020-06-25 PROCEDURE — 97110 THERAPEUTIC EXERCISES: CPT

## 2020-06-25 PROCEDURE — G0283 ELEC STIM OTHER THAN WOUND: HCPCS

## 2020-06-30 ENCOUNTER — HOSPITAL ENCOUNTER (OUTPATIENT)
Dept: PHYSICAL THERAPY | Age: 60
Setting detail: THERAPIES SERIES
Discharge: HOME | End: 2020-06-30
Payer: COMMERCIAL

## 2020-06-30 PROCEDURE — G0283 ELEC STIM OTHER THAN WOUND: HCPCS

## 2020-06-30 PROCEDURE — 97140 MANUAL THERAPY 1/> REGIONS: CPT

## 2020-06-30 PROCEDURE — 97110 THERAPEUTIC EXERCISES: CPT

## 2020-06-30 NOTE — FLOWSHEET NOTE
[address] [facility]  Phone: [phone]   Fax: [phone]    Physical Therapy Treatment Note/ Progress Report:     Date:  2020    Patient Name:  Kathy Foley    :  1960  MRN: [id number]  Restrictions/Precautions:    Medical/Treatment Diagnosis Information:    Diagnosis: J55.906 (ICD-10-CM) - Hip pain, left    Treatment Diagnosis: L hip pain, weakness, decreased ROM   Insurance/Certification information:    PT Insurance Information: [facility]   Physician Information:    Referring Practitioner: CHERYL Meredith CNP  Plan of care signed (Y/N): [x]  Yes []  No     Date of Patient follow up with Physician:      Progress Report: []  Yes  [x]  No     Date Range for reporting period:    Beginnin20  Endin20    Progress report due (10 Rx/or 30 days whichever is less): 10 tx     Recertification due (POC duration/ or 90 days whichever is less): 20     Visit # Insurance Allowable Auth required? Date Range    30 py []  Yes  [x]  No 20 -      Latex Allergy:  [x]NO      []YES  Preferred Language for Healthcare:   [x]English       []other:    Functional Scale:        Date assessed:  LEFS: raw score = 39; dysfunction = 49%   20    Pain level:  6/10 L hip     SUBJECTIVE:  Pt is feeling more sore today. He states he woke up and was stiff and hard to more.       OBJECTIVE:       RESTRICTIONS/PRECAUTIONS: na    Exercises/Interventions:     Therapeutic Exercise (55971)  Resistance / level Sets/sec Reps Notes / Cues   Bike  Treadmill  2.0   5'        bridges  SLR 2lbs 2 10  added weightHip abd  2 lbs 2 10  added weightStanding squat   SKC  4 way hip - SLR  LLE   Total gym  , abd  2 10 ^ squat depth , abd added    Gliders - abd/ext  B   SL clamshells BLUE ^ to blue and 3 sets   Hip hike     Banded lat walk blue ^ to blue   Banded 3 way hip at ballet bar lime LLE   Hip abd / add machine each   Knee ext / flex machine Each Leg press     CC 4 way hip  1 plate on abd   Bridges on SB  2 10    Hip isometric at 90 degrees flex  1 10 Standing , SB on wall, B          Lunge on Bosu  B   Hip iso with SB , Bilateral   Standing, hip at 90 , 5 sec holds   TRX: squat, lunge, lat lunge , hip hike, SLS  Each , B   CC walk outs fwd, retro, lat 3 plates 1 10 Added 0/98   Wall squats   3 20'' Added 6/30   Lateral step downs - 6 in step                         SB hip flexion  supine   Therapeutic Activities (46334)       Step ups   LLE   Lifting - hip hinge   8lb weight lifting from 6 in box                 Neuromuscular Re-ed (95066)                            Manual Intervention (39848)       L Leg pull, lateral hip mob , L hip PROM  x10 min                                              Pt. Education:  -pt educated on diagnosis, prognosis and expectations for rehab  -all pt questions were answered    Home Exercise Program:  Access Code: [email or web address]   URL: [email or web address]   Date: 05/26/2020   Prepared by: Juliocesar Bring     Exercises   Supine Bridge - 10 reps - 3 sets - 2x daily - 7x weekly   Clamshell with Resistance - 10 reps - 3 sets - 2x daily - 7x weekly   Squat - 10 reps - 3 sets - 2x daily - 7x weekly   Supine Active Straight Leg Raise - 10 reps - 3 sets - 2x daily - 7x weekly   Sidelying Hip Abduction - 10 reps - 3 sets - 2x daily - 7x weekly       Therapeutic Exercise and NMR EXR  [x] (48834) Provided verbal/tactile cueing for activities related to strengthening, flexibility, endurance, ROM for improvements in LE, proximal hip, and core control with self care, mobility, lifting, ambulation.  [] (88602) Provided verbal/tactile cueing for activities related to improving balance, coordination, kinesthetic sense, posture, motor skill, proprioception  to assist with LE, proximal hip, and core control in self care, mobility, lifting, ambulation and eccentric single leg control.   [] (93925) Therapist is in constant attendance of 2 or more patients providing skilled therapy interventions, but not providing any significant amount of measurable one-on-one time to either patient, for improvements in LE, proximal hip, and core control in self care, mobility, lifting, ambulation and eccentric single leg control. NMR and Therapeutic Activities:    [x] (45812 or 09535) Provided verbal/tactile cueing for activities related to improving balance, coordination, kinesthetic sense, posture, motor skill, proprioception and motor activation to allow for proper function of core, proximal hip and LE with self care and ADLs  [] (17424) Gait Re-education- Provided training and instruction to the patient for proper LE, core and proximal hip recruitment and positioning and eccentric body weight control with ambulation re-education including up and down stairs     Home Exercise Program:    [x] (91660) Reviewed/Progressed HEP activities related to strengthening, flexibility, endurance, ROM of core, proximal hip and LE for functional self-care, mobility, lifting and ambulation/stair navigation   [] (59109)Reviewed/Progressed HEP activities related to improving balance, coordination, kinesthetic sense, posture, motor skill, proprioception of core, proximal hip and LE for self care, mobility, lifting, and ambulation/stair navigation      Manual Treatments:  PROM / STM / Oscillations-Mobs:  G-I, II, III, IV (PA's, Inf., Post.)  [x] (31366) Provided manual therapy to mobilize LE, proximal hip and/or LS spine soft tissue/joints for the purpose of modulating pain, promoting relaxation,  increasing ROM, reducing/eliminating soft tissue swelling/inflammation/restriction, improving soft tissue extensibility and allowing for proper ROM for normal function with self care, mobility, lifting and ambulation.      Modalities:  [x] (04865) Vasopneumatic compression: Utilized vasopneumatic compression to decrease edema / swelling for the purpose of improving mobility and quad tone / recruitment which will allow for increased overall function including but not limited to self-care, transfers, ambulation, and ascending / descending stairs. Modalities:   IFC ES to L hip for 15 min     Charges:  Timed Code Treatment Minutes: 45   Total Treatment Minutes: 60     [] EVAL - LOW (54004)   [] EVAL - MOD (00950)  [] EVAL - HIGH (42195)  [] RE-EVAL (70124)  [x] EM(62764) x 2     [] Ionto  [] NMR (56264) x      [] Vaso  [x] Manual (92450) x 1     [] Ultrasound  [] TA x 1       [] Mech Traction (46244)  [] Aquatic Therapy x     [x] ES (un) (54223):   [] Home Management Training x [] ES(attended) (45505)   [] Group:     [] Other:     GOALS:  Patient stated goal: to walk with no pain   [x]? Progressing: []? Met: []? Not Met: []? Adjusted     Therapist goals for Patient:   Short Term Goals: To be achieved in: 2 weeks  1. Independent in HEP and progression per patient tolerance, in order to prevent re-injury. [x]? Progressing: []? Met: []? Not Met: []? Adjusted  2. Patient will have a decrease in pain to 3/10 or below consistently to facilitate improvement in movement, function, and ADLs as indicated by Functional Deficits. [x]? Progressing: []? Met: []? Not Met: []? Adjusted     Long Term Goals: To be achieved in: 6 weeks  1. Disability index score of 25% or less for the LEFS to assist with reaching prior level of function. [x]? Progressing: []? Met: []? Not Met: []? Adjusted  3. Patient will demonstrate an increase in Strength of LLE to at least 4+ grossly as well as good proximal hip strength and control to allow for proper functional mobility as indicated by patients Functional Deficits. [x]? Progressing: []? Met: []? Not Met: []? Adjusted  4. Patient will return to functional activities including walking with no AD without increased symptoms or restriction. [x]? Progressing: []? Met: []? Not Met: []?  Adjusted      Overall Progression Towards Functional goals/ Treatment Progress Update:  [] Patient is progressing as expected towards functional goals listed. [] Progression is slowed due to complexities/Impairments listed. [] Progression has been slowed due to co-morbidities. [x] Plan just implemented, too soon to assess goals progression <30days   [] Goals require adjustment due to lack of progress  [] Patient is not progressing as expected and requires additional follow up with physician  [] Other    Persisting Functional Limitations/Impairments:  [x]Sitting [x]Standing   [x]Walking [x]Stairs   [x]Transfers [x]ADLs   [x]Squatting/bending [x]Kneeling  []Housework []Job related tasks  []Driving [x]Sports/Recreation   []Sleeping []Other:    ASSESSMENT:   Pt L hip ROM improved after manual tx, pt able to perform new exercises well with slight soreness but tolerable. Progress note next visit. Treatment/Activity Tolerance:  [x] Pt able to complete treatment [] Patient limited by fatique  [] Patient limited by pain  [] Patient limited by other medical complications  [] Other:     Prognosis: [x] Good [] Fair  [] Poor    Patient Requires Follow-up: [x] Yes  [] No      PLAN: See eval. PT 1-2 x / week for 6 weeks. [x] Continue per plan of care [] Alter current plan (see comments)  [] Plan of care initiated [] Hold pending MD visit [] Discharge    Electronically signed by: Hao Goldsmith PT, DPT      Note: If patient does not return for scheduled/ recommended follow up visits, this note will serve as a discharge from care along with most recent update on progress.

## 2020-07-02 ENCOUNTER — HOSPITAL ENCOUNTER (OUTPATIENT)
Dept: PHYSICAL THERAPY | Age: 60
Setting detail: THERAPIES SERIES
Discharge: HOME | End: 2020-07-02
Payer: COMMERCIAL

## 2020-07-02 NOTE — PROGRESS NOTES
Physical Therapy  Cancellation/No-show Note  Patient Name:  Davie Worrell  :  1960   Date:  2020  Cancelled visits to date: 2  No-shows to date: 1    Patient status for today's appointment patient:  [x]  Cancelled   []  Rescheduled appointment  []  No-show      Reason given by patient:  []  Patient ill  []  Conflicting appointment  []  No transportation    []  Conflict with work  []  No reason given  [x]  Other:     Comments:   Family conflict this morning     Phone call information:   []  Phone call made today to patient at _ time at number provided:      []  Patient answered, conversation as follows:    []  Patient did not answer, message left as follows:  [x]  Phone call not made today    Electronically signed by:  Chikis Zaldivar PT

## 2020-07-07 RX ORDER — LISINOPRIL 20 MG/1
TABLET ORAL
Qty: 30 TABLET | Refills: 0 | Status: SHIPPED | OUTPATIENT
Start: 2020-07-07 | End: 2020-07-17 | Stop reason: SDUPTHER

## 2020-07-13 ENCOUNTER — TELEPHONE (OUTPATIENT)
Dept: INTERNAL MEDICINE CLINIC | Age: 60
End: 2020-07-13

## 2020-07-15 ASSESSMENT — ENCOUNTER SYMPTOMS
VOMITING: 0
ABDOMINAL PAIN: 0
SINUS PAIN: 0
SHORTNESS OF BREATH: 0
NAUSEA: 0
BLOOD IN STOOL: 0
COUGH: 0
SORE THROAT: 0
CONSTIPATION: 0
BACK PAIN: 0
DIARRHEA: 0
WHEEZING: 0

## 2020-07-15 NOTE — PROGRESS NOTES
(PRINIVIL;ZESTRIL) 20 MG tablet TAKE 1 TABLET BY MOUTH EVERY DAY 90 tablet 0     Social History     Socioeconomic History    Marital status: Single     Spouse name: Not on file    Number of children: Not on file    Years of education: Not on file    Highest education level: Not on file   Occupational History    Not on file   Social Needs    Financial resource strain: Not hard at all    Food insecurity     Worry: Never true     Inability: Never true   [employer] needs     Medical: No     Non-medical: No   Tobacco Use    Smoking status: Never Smoker    Smokeless tobacco: Never Used   Substance and Sexual Activity    Alcohol use: Not Currently     Comment: has not drank in 3 years- before that, social    Drug use: Yes     Types: Marijuana     Comment: 2x/day    Sexual activity: Not on file     Comment: single    Lifestyle    Physical activity     Days per week: Not on file     Minutes per session: Not on file    Stress: Not on file   Relationships    Social connections     Talks on phone: Not on file     Gets together: Not on file     Attends [employer] service: Not on file     Active member of club or organization: Not on file     Attends meetings of clubs or organizations: Not on file     Relationship status: Not on file    Intimate partner violence     Fear of current or ex partner: Not on file     Emotionally abused: Not on file     Physically abused: Not on file     Forced sexual activity: Not on file   Other Topics Concern    Not on file   Social History Narrative    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 He has family in town. 2 children. For fun, he plays with his grandchildren- 5 of them.      Past Medical History:   Diagnosis Date    Hypertension     Prediabetes      Patient Active Problem List   Diagnosis    Hypertension    Prediabetes     Last Labs:  Orders Only on 06/16/2020   Component Date Value Ref Range Status    Hemoglobin A1C 06/16/2020 5.7  See comment % Final    Comment: Comment:  Diagnosis of Diabetes: > or = 6.5%  Increased risk of diabetes (Prediabetes): 5.7-6.4%  Glycemic Control: Nonpregnant Adults: <7.0%                    Pregnant: <6.0%        eAG 06/16/2020 116.9  mg/dL Final    Hep C Ab Interp 06/16/2020 Non-reactive  Non-reactive Final    Sodium 06/16/2020 141  136 - 145 mmol/L Final    Potassium 06/16/2020 4.1  3.5 - 5.1 mmol/L Final    Chloride 06/16/2020 103  99 - 110 mmol/L Final    CO2 06/16/2020 27  21 - 32 mmol/L Final    Anion Gap 06/16/2020 11  3 - 16 Final    Glucose 06/16/2020 94  70 - 99 mg/dL Final    BUN 06/16/2020 5* 7 - 20 mg/dL Final    CREATININE 06/16/2020 0.6* 0.9 - 1.3 mg/dL Final    GFR Non- 06/16/2020 >60  >60 Final    Comment: >60 mL/min/1.73m2 EGFR, calc. for ages 25 and older using the  MDRD formula (not corrected for weight), is valid for stable  renal function.  GFR  06/16/2020 >60  >60 Final    Comment: Chronic Kidney Disease: less than 60 ml/min/1.73 sq.m. Kidney Failure: less than 15 ml/min/1.73 sq.m. Results valid for patients 18 years and older.       Calcium 06/16/2020 9.1  8.3 - 10.6 mg/dL Final    Total Protein 06/16/2020 6.7  6.4 - 8.2 g/dL Final    Alb 06/16/2020 4.3  3.4 - 5.0 g/dL Final    Albumin/Globulin Ratio 06/16/2020 1.8  1.1 - 2.2 Final    Total Bilirubin 06/16/2020 0.8  0.0 - 1.0 mg/dL Final    Alkaline Phosphatase 06/16/2020 71  40 - 129 U/L Final    ALT 06/16/2020 9* 10 - 40 U/L Final    AST 06/16/2020 11* 15 - 37 U/L Final    Globulin 06/16/2020 2.4  g/dL Final      Wt Readings from Last 3 Encounters:   07/17/20 186 lb (84.4 kg)   03/13/20 188 lb (85.3 kg)   11/14/19 189 lb (85.7 kg)     BP Readings from Last 3 Encounters:   07/17/20 122/86   03/13/20 132/84   11/14/19 128/82     PHQ-9 Total Score: 0 (7/17/2020  1:26 PM)    Objective/Physical Exam:  /86 (Site: Left Upper Arm, Position: Sitting)   Pulse 74   Temp 97 °F (36.1 °C) (Temporal)   Ht 5' 6\" (1.676 m)   Wt 186 lb (84.4 kg)   SpO2 98%   BMI 30.02 kg/m²   Body mass index is 30.02 kg/m². Physical Exam  Vitals signs reviewed. Constitutional:       General: He is not in acute distress. Appearance: He is well-developed. He is not diaphoretic. HENT:      Head: Normocephalic and atraumatic. Right Ear: Tympanic membrane and external ear normal.      Left Ear: Tympanic membrane and external ear normal.   Eyes:      Pupils: Pupils are equal, round, and reactive to light. Cardiovascular:      Rate and Rhythm: Normal rate and regular rhythm. Pulmonary:      Effort: Pulmonary effort is normal. No respiratory distress. Breath sounds: Normal breath sounds. No wheezing or rales. Chest:      Chest wall: No tenderness. Abdominal:      General: Bowel sounds are normal. There is no distension. Palpations: Abdomen is soft. Tenderness: There is no abdominal tenderness. There is no guarding. Musculoskeletal: Normal range of motion. General: No tenderness or deformity. Skin:     General: Skin is warm and dry. Coloration: Skin is not pale. Findings: No erythema or rash. Neurological:      Mental Status: He is alert and oriented to person, place, and time. Coordination: Coordination normal.   Psychiatric:         Mood and Affect: Mood normal.       Assessment and Plan:  Ayaka Trejo was seen today for annual exam and medication refill. Diagnoses and all orders for this visit:    Annual physical exam   - Appears half of the patient's labs were completed and the other half were not. He is agreeable to having his fasting labs completed at this time. Essential hypertension  -    Blood pressure borderline today. - Reviewed goal blood pressure of less than 130/80 with the patient. He is agreeable to monitoring his blood pressure at home and calling with elevated readings.   - Lifestyle modifications such as exercise, weight loss and healthy diet encouraged and reviewed with the pt. - Continue current medication regimen and add lifestyle modifications. - lisinopril (PRINIVIL;ZESTRIL) 20 MG tablet; TAKE 1 TABLET BY MOUTH EVERY DAY-refilled today    Hip pain, left   - Improving with PT per pt. - Patient will call if symptoms worsen or fail to improve    Prediabetes   - Lifestyle modifications such as exercise, weight loss and healthy diet encouraged and reviewed with the pt. Marijuana abuse   - Cessation recommended    Return in about 3 months (around 10/17/2020) for HTN/prediabetes f/u, or sooner if needed. Pt will call if symptoms worsen or fail to improve. All questions answered. Pt states no further questions or concerns at this time.    Electronically signed by: Rebecca Marsh 07/17/20

## 2020-07-17 ENCOUNTER — OFFICE VISIT (OUTPATIENT)
Dept: INTERNAL MEDICINE CLINIC | Age: 60
End: 2020-07-17
Payer: COMMERCIAL

## 2020-07-17 VITALS
TEMPERATURE: 97 F | BODY MASS INDEX: 29.89 KG/M2 | OXYGEN SATURATION: 98 % | HEART RATE: 74 BPM | DIASTOLIC BLOOD PRESSURE: 86 MMHG | WEIGHT: 186 LBS | SYSTOLIC BLOOD PRESSURE: 122 MMHG | HEIGHT: 66 IN

## 2020-07-17 PROCEDURE — 99396 PREV VISIT EST AGE 40-64: CPT | Performed by: NURSE PRACTITIONER

## 2020-07-17 RX ORDER — LISINOPRIL 20 MG/1
TABLET ORAL
Qty: 90 TABLET | Refills: 0 | Status: SHIPPED | OUTPATIENT
Start: 2020-07-17 | End: 2020-10-14 | Stop reason: SDUPTHER

## 2020-07-17 ASSESSMENT — PATIENT HEALTH QUESTIONNAIRE - PHQ9
SUM OF ALL RESPONSES TO PHQ QUESTIONS 1-9: 0
2. FEELING DOWN, DEPRESSED OR HOPELESS: 0
1. LITTLE INTEREST OR PLEASURE IN DOING THINGS: 0
SUM OF ALL RESPONSES TO PHQ9 QUESTIONS 1 & 2: 0
SUM OF ALL RESPONSES TO PHQ QUESTIONS 1-9: 0

## 2020-07-17 NOTE — PATIENT INSTRUCTIONS
Please get your fasting lab work (no food or drink for 10-12 hours prior besides water) completed M-F 830a-430p at our office. [facility] lab has walk-in hours available as well - they are open Saturday 7a-3p - no appointment is needed. We will call with your results.

## 2020-07-20 ENCOUNTER — HOSPITAL ENCOUNTER (OUTPATIENT)
Dept: PHYSICAL THERAPY | Age: 60
Setting detail: THERAPIES SERIES
Discharge: HOME | End: 2020-07-20
Payer: COMMERCIAL

## 2020-07-20 NOTE — PROGRESS NOTES
Physical Therapy  Cancellation/No-show Note  Patient Name:  Kathy Foley  :  1960   Date:  2020  Cancelled visits to date: 3  No-shows to date: 2    Patient status for today's appointment patient:  [x]  Cancelled  ,   []  Rescheduled appointment  []  No-show  ,      Reason given by patient:  []  Patient ill  []  Conflicting appointment  []  No transportation    []  Conflict with work  []  No reason given  [x]  Other:   Family emergency    Comments:       Phone call information:   []  Phone call made today to patient at _ time at number provided:      []  Patient answered, conversation as follows: Pt states he called and left a message to have to cancel, said he had to babysit. He is aware of next appt on Wednesday and states he will be here.     []  Patient did not answer, message left as follows:  [x]  Phone call not made today    Electronically signed by:  Renard Mac PT

## 2020-07-22 ENCOUNTER — APPOINTMENT (OUTPATIENT)
Dept: PHYSICAL THERAPY | Age: 60
End: 2020-07-22
Payer: COMMERCIAL

## 2020-07-23 ENCOUNTER — HOSPITAL ENCOUNTER (OUTPATIENT)
Dept: PHYSICAL THERAPY | Age: 60
Setting detail: THERAPIES SERIES
Discharge: HOME | End: 2020-07-23
Payer: COMMERCIAL

## 2020-07-23 NOTE — PROGRESS NOTES
Physical Therapy  Cancellation/No-show Note  Patient Name:  Kassandra Weber  :  1960   Date:  2020  Cancelled visits to date: 4  No-shows to date: 2    Patient status for today's appointment patient:  [x]  Cancelled  ,  ,   []  Rescheduled appointment  []  No-show  ,      Reason given by patient:  []  Patient ill  []  Conflicting appointment  []  No transportation    []  Conflict with work  []  No reason given  [x]  Other:   Family emergency    Comments:       Phone call information:   []  Phone call made today to patient at _ time at number provided:      []  Patient answered, conversation as follows: Pt states he called and left a message to have to cancel, said he had to babysit. He is aware of next appt on Wednesday and states he will be here.     []  Patient did not answer, message left as follows:  [x]  Phone call not made today    Electronically signed by:  Mireya Red, PT

## 2020-07-27 ENCOUNTER — APPOINTMENT (OUTPATIENT)
Dept: PHYSICAL THERAPY | Age: 60
End: 2020-07-27
Payer: COMMERCIAL

## 2020-07-28 ENCOUNTER — HOSPITAL ENCOUNTER (OUTPATIENT)
Dept: PHYSICAL THERAPY | Age: 60
Setting detail: THERAPIES SERIES
Discharge: HOME | End: 2020-07-28
Payer: COMMERCIAL

## 2020-07-28 PROCEDURE — 97530 THERAPEUTIC ACTIVITIES: CPT

## 2020-07-28 NOTE — PLAN OF CARE
[address] [facility]  Phone: [phone]   Fax: [phone]     Physical Therapy Discharge Summary    Dear  , CHERYL Álvarez Aas, CNP    We had the pleasure of treating the following patient for physical therapy services at [address] [place] [address]. A summary of our findings can be found in the discharge summary below. If you have any questions or concerns regarding these findings, please do not hesitate to contact me at the office phone number checked above. Thank you for the referral.     Physician Signature:________________________________Date:__________________  By signing above (or electronic signature), therapists plan is approved by physician      Functional Outcome: LEFS: raw score = 43; dysfunction = 47%   20    Overall Response to Treatment:   []Patient is responding well to treatment and improvement is noted with regards  to goals   [x]Patient should continue to improve in reasonable time if they continue HEP   []Patient has plateaued and is no longer responding to skilled PT intervention    []Patient is getting worse and would benefit from return to referring MD   []Patient unable to adhere to initial POC   [x]Other: Pt has a lot of family things going on at this time. He wishes to d/c and continue HEP. Pt educated on OA and management. Date range of Visits: 20 - 20  Total Visits: 10    Recommendation: continue HEP   [x] Discharge to HEP. Follow up with PT PRN.      Physical Therapy Treatment Note/ Progress Report:     Date:  2020    Patient Name:  Chichi Butcher    :  1960  MRN: [id number]  Restrictions/Precautions:    Medical/Treatment Diagnosis Information:    Diagnosis: Z94.054 (ICD-10-CM) - Hip pain, left    Treatment Diagnosis: L hip pain, weakness, decreased ROM   Insurance/Certification information:    PT Insurance Information: [facility]   Physician Information:    Referring Practitioner: Kaitlin Martinez 21'' Added 6/30                        supine   Therapeutic Activities (52612)        LLE    8lb weight lifting from 6 in box   7/28: time spent discussing options for therapy, expectations of therapy, importance of continuing HEP and answered questions regarding Ortho MD consult. 20'            Neuromuscular Re-ed (86084)                            Manual Intervention (17383)         x10 min                                              Pt. Education:  -pt educated on diagnosis, prognosis and expectations for rehab  -all pt questions were answered    Home Exercise Program:  Access Code: [email or web address]   URL: [email or web address]   Date: 05/26/2020   Prepared by: Eduard Cavazos     Exercises   Supine Bridge - 10 reps - 3 sets - 2x daily - 7x weekly   Clamshell with Resistance - 10 reps - 3 sets - 2x daily - 7x weekly   Squat - 10 reps - 3 sets - 2x daily - 7x weekly   Supine Active Straight Leg Raise - 10 reps - 3 sets - 2x daily - 7x weekly   Sidelying Hip Abduction - 10 reps - 3 sets - 2x daily - 7x weekly       Therapeutic Exercise and NMR EXR  [x] (96112) Provided verbal/tactile cueing for activities related to strengthening, flexibility, endurance, ROM for improvements in LE, proximal hip, and core control with self care, mobility, lifting, ambulation.  [] (70610) Provided verbal/tactile cueing for activities related to improving balance, coordination, kinesthetic sense, posture, motor skill, proprioception  to assist with LE, proximal hip, and core control in self care, mobility, lifting, ambulation and eccentric single leg control.   [] (70191) Therapist is in constant attendance of 2 or more patients providing skilled therapy interventions, but not providing any significant amount of measurable one-on-one time to either patient, for improvements in LE, proximal hip, and core control in self care, mobility, lifting, ambulation and eccentric single leg control.      NMR and Therapeutic Activities:    [x] (31011 or ) Provided verbal/tactile cueing for activities related to improving balance, coordination, kinesthetic sense, posture, motor skill, proprioception and motor activation to allow for proper function of core, proximal hip and LE with self care and ADLs  [] (96290) Gait Re-education- Provided training and instruction to the patient for proper LE, core and proximal hip recruitment and positioning and eccentric body weight control with ambulation re-education including up and down stairs     Home Exercise Program:    [x] (68554) Reviewed/Progressed HEP activities related to strengthening, flexibility, endurance, ROM of core, proximal hip and LE for functional self-care, mobility, lifting and ambulation/stair navigation   [] (24296)Reviewed/Progressed HEP activities related to improving balance, coordination, kinesthetic sense, posture, motor skill, proprioception of core, proximal hip and LE for self care, mobility, lifting, and ambulation/stair navigation      Manual Treatments:  PROM / STM / Oscillations-Mobs:  G-I, II, III, IV (PA's, Inf., Post.)  [x] (19052) Provided manual therapy to mobilize LE, proximal hip and/or LS spine soft tissue/joints for the purpose of modulating pain, promoting relaxation,  increasing ROM, reducing/eliminating soft tissue swelling/inflammation/restriction, improving soft tissue extensibility and allowing for proper ROM for normal function with self care, mobility, lifting and ambulation. Modalities:  [x] (49852) Vasopneumatic compression: Utilized vasopneumatic compression to decrease edema / swelling for the purpose of improving mobility and quad tone / recruitment which will allow for increased overall function including but not limited to self-care, transfers, ambulation, and ascending / descending stairs.        Modalities:      Charges:  Timed Code Treatment Minutes: 20   Total Treatment Minutes: 40     [] EVAL - LOW (18472)   [] EVAL - MOD (86585)  [] EVAL - HIGH (07272)  [] RE-EVAL (50509)  [] PZ(44256) x      [] Ionto  [] NMR (96376) x      [] Vaso  [] Manual (09237) x      [] Ultrasound  [x] TA x 1       [] Mech Traction (86687)  [] Aquatic Therapy x     [] ES (un) (25288):   [] Home Management Training x [] ES(attended) (41229)   [] Group:     [] Other:     GOALS:  Patient stated goal: to walk with no pain   [x]? Progressing: []? Met: []? Not Met: []? Adjusted     Therapist goals for Patient:   Short Term Goals: To be achieved in: 2 weeks  1. Independent in HEP and progression per patient tolerance, in order to prevent re-injury. [x]? Progressing: []? Met: []? Not Met: []? Adjusted  2. Patient will have a decrease in pain to 3/10 or below consistently to facilitate improvement in movement, function, and ADLs as indicated by Functional Deficits. [x]? Progressing: []? Met: []? Not Met: []? Adjusted     Long Term Goals: To be achieved in: 6 weeks  1. Disability index score of 25% or less for the LEFS to assist with reaching prior level of function. []? Progressing: []? Met: [x]? Not Met: []? Adjusted  3. Patient will demonstrate an increase in Strength of LLE to at least 4+ grossly as well as good proximal hip strength and control to allow for proper functional mobility as indicated by patients Functional Deficits. []? Progressing: []? Met: [x]? Not Met: []? Adjusted  - see above   4. Patient will return to functional activities including walking with no AD without increased symptoms or restriction. - still uses SPC on occasion if he has a lot of pain  []? Progressing: []? Met: [x]? Not Met: []? Adjusted      Overall Progression Towards Functional goals/ Treatment Progress Update:  [] Patient is progressing as expected towards functional goals listed. [] Progression is slowed due to complexities/Impairments listed. [] Progression has been slowed due to co-morbidities.   [x] Plan just implemented, too soon to assess goals progression <30days   [] Goals require adjustment due to lack of progress  [] Patient is not progressing as expected and requires additional follow up with physician  [] Other    Persisting Functional Limitations/Impairments:  [x]Sitting [x]Standing   [x]Walking [x]Stairs   [x]Transfers [x]ADLs   [x]Squatting/bending [x]Kneeling  []Housework []Job related tasks  []Driving [x]Sports/Recreation   []Sleeping []Other:    ASSESSMENT:    Pt demonstrates slight improved functional score as compared to initial visit on LEFS but repots verbally much improved functional rating. Pt also demonstrates some improvement in strength as noted above compared to initial visit but demonstrates increased hamstring tightness with manual ROM. Continues to have restrictions due to catching and locking in hip but otherwise states he is able to perform required daily activity. Spent time this date answering questions regarding expectations of therapy, importance of follow through with HEP, answered questions regarding consult with Ortho MD as able. Pt will now be d/c to HEP due to difficulty with coming to visits with family problems going on and pt has been thoroughly educated on management of OA and exercises. Treatment/Activity Tolerance:  [x] Pt able to complete treatment [] Patient limited by fatique  [] Patient limited by pain  [] Patient limited by other medical complications  [] Other:     Prognosis: [x] Good [] Fair  [] Poor    Patient Requires Follow-up: [x] Yes  [] No      PLAN: Pt wants to discharge at this time due to visit limit and   [] Continue per plan of care [] Alter current plan (see comments)  [] Plan of care initiated [] Hold pending MD visit [x] Discharge    Electronically signed by: Anabel Souza PT, DPT      Note: If patient does not return for scheduled/ recommended follow up visits, this note will serve as a discharge from care along with most recent update on progress.

## 2020-07-30 ENCOUNTER — HOSPITAL ENCOUNTER (OUTPATIENT)
Dept: PHYSICAL THERAPY | Age: 60
Setting detail: THERAPIES SERIES
Discharge: HOME | End: 2020-07-30
Payer: COMMERCIAL

## 2020-10-07 ENCOUNTER — TELEPHONE (OUTPATIENT)
Dept: INTERNAL MEDICINE CLINIC | Age: 60
End: 2020-10-07

## 2020-10-14 ENCOUNTER — OFFICE VISIT (OUTPATIENT)
Dept: INTERNAL MEDICINE CLINIC | Age: 60
End: 2020-10-14
Payer: COMMERCIAL

## 2020-10-14 VITALS
DIASTOLIC BLOOD PRESSURE: 94 MMHG | WEIGHT: 191.8 LBS | TEMPERATURE: 97.8 F | HEART RATE: 96 BPM | BODY MASS INDEX: 30.96 KG/M2 | SYSTOLIC BLOOD PRESSURE: 138 MMHG | OXYGEN SATURATION: 98 %

## 2020-10-14 PROCEDURE — G8417 CALC BMI ABV UP PARAM F/U: HCPCS | Performed by: NURSE PRACTITIONER

## 2020-10-14 PROCEDURE — 99214 OFFICE O/P EST MOD 30 MIN: CPT | Performed by: NURSE PRACTITIONER

## 2020-10-14 PROCEDURE — G8482 FLU IMMUNIZE ORDER/ADMIN: HCPCS | Performed by: NURSE PRACTITIONER

## 2020-10-14 PROCEDURE — 90686 IIV4 VACC NO PRSV 0.5 ML IM: CPT | Performed by: NURSE PRACTITIONER

## 2020-10-14 PROCEDURE — 3017F COLORECTAL CA SCREEN DOC REV: CPT | Performed by: NURSE PRACTITIONER

## 2020-10-14 PROCEDURE — G8427 DOCREV CUR MEDS BY ELIG CLIN: HCPCS | Performed by: NURSE PRACTITIONER

## 2020-10-14 PROCEDURE — 1036F TOBACCO NON-USER: CPT | Performed by: NURSE PRACTITIONER

## 2020-10-14 PROCEDURE — 90471 IMMUNIZATION ADMIN: CPT | Performed by: NURSE PRACTITIONER

## 2020-10-14 RX ORDER — LISINOPRIL 30 MG/1
TABLET ORAL
Qty: 30 TABLET | Refills: 0 | Status: SHIPPED | OUTPATIENT
Start: 2020-10-14 | End: 2020-12-14

## 2020-10-14 ASSESSMENT — ENCOUNTER SYMPTOMS
WHEEZING: 0
COUGH: 0
SHORTNESS OF BREATH: 0

## 2020-10-14 NOTE — PROGRESS NOTES
Office Visit   10/14/2020    Subjective:  Chief Complaint   Patient presents with    Hypertension     f/u    Discuss Labs     pre diabetes     Flu Vaccine     today      HPI:  Yasmine Wilson is a 61 y.o. male who presents to the clinic today for follow up. HTN-Prescribed lisinopril 20 mg daily without side effects.  Does not take his BP at home. Asymptomatic. No chest pain, palpitations, shortness of breath, trouble breathing, lightheadedness, dizziness or blurred vision.     Prediabetes- No exercise. Denies any diet changes per pt. States that he has a \"notorious sweet tooth. \"    Marijuana abuse- using 2x/day. States he is aware he needs to stop, but not at this time. Left hip pain- Xrays completed. Performed PT. Would like to see ortho. States pains are still present. States he had an inguinal hernia repair since last visit. Asymptomatic at this time. States he is calling to schedule his colonoscopy today. Review of Systems   Constitutional: Negative for chills, fatigue, fever and unexpected weight change. Eyes: Negative for visual disturbance. Respiratory: Negative for cough, shortness of breath and wheezing. Cardiovascular: Negative for chest pain, palpitations and leg swelling. Musculoskeletal:        Left hip pain   Skin: Negative for pallor and rash. Neurological: Negative for dizziness, weakness, light-headedness, numbness and headaches.      No Known Allergies    Current Outpatient Rx   Medication Sig Dispense Refill    lisinopril (PRINIVIL;ZESTRIL) 30 MG tablet TAKE 1 TABLET BY MOUTH EVERY DAY 30 tablet 0     Patient Active Problem List   Diagnosis    Hypertension    Prediabetes      Wt Readings from Last 3 Encounters:   10/14/20 191 lb 12.8 oz (87 kg)   07/17/20 186 lb (84.4 kg)   03/13/20 188 lb (85.3 kg)     BP Readings from Last 3 Encounters:   10/14/20 (!) 138/94   07/17/20 122/86   03/13/20 132/84     Objective/Physical Exam:  BP (!) 138/94 (Site: Left Upper Arm, Position: Sitting)   Pulse 96   Temp 97.8 °F (36.6 °C) (Temporal)   Wt 191 lb 12.8 oz (87 kg)   SpO2 98%   BMI 30.96 kg/m²   Body mass index is 30.96 kg/m². Physical Exam  Vitals signs reviewed. Constitutional:       General: He is not in acute distress. Appearance: He is well-developed. He is not diaphoretic. HENT:      Head: Normocephalic and atraumatic. Eyes:      Pupils: Pupils are equal, round, and reactive to light. Cardiovascular:      Rate and Rhythm: Normal rate and regular rhythm. Pulmonary:      Effort: Pulmonary effort is normal. No respiratory distress. Breath sounds: Normal breath sounds. No wheezing or rales. Chest:      Chest wall: No tenderness. Abdominal:      General: Bowel sounds are normal.      Palpations: Abdomen is soft. Skin:     General: Skin is warm and dry. Coloration: Skin is not pale. Findings: No erythema or rash. Neurological:      Mental Status: He is alert and oriented to person, place, and time. Coordination: Coordination normal.   Psychiatric:         Mood and Affect: Mood normal.       Assessment and Plan:  Perri Sandra was seen today for hypertension, discuss labs and flu vaccine. Diagnoses and all orders for this visit:    Essential hypertension  - Blood pressure not to goal.  He is asymptomatic. Denies side effects on the current medication.  - Recommended the patient increase lisinopril to 30 mg by mouth daily.    - Also strongly recommended the patient increase lifestyle modifications. Patient is agreeable to the plan. - lisinopril (PRINIVIL;ZESTRIL) 30 MG tablet; TAKE 1 TABLET BY MOUTH EVERY DAY-increased dose    Hip pain, left  -    Patient has used anti-inflammatories, performed an x-ray and completed physical therapy and pain is still occurring. He would like to see an orthopedic at this time for further evaluation.   Referral placed today  - Daniella Whittington MD, Orthopedic Surgery,

## 2020-11-09 RX ORDER — LISINOPRIL 20 MG/1
TABLET ORAL
Qty: 90 TABLET | Refills: 0 | OUTPATIENT
Start: 2020-11-09

## 2020-11-10 ENCOUNTER — OFFICE VISIT (OUTPATIENT)
Dept: ORTHOPEDIC SURGERY | Age: 60
End: 2020-11-10
Payer: COMMERCIAL

## 2020-11-10 VITALS — HEIGHT: 66 IN | WEIGHT: 191 LBS | BODY MASS INDEX: 30.7 KG/M2 | TEMPERATURE: 97.7 F

## 2020-11-10 PROBLEM — M16.12 PRIMARY OSTEOARTHRITIS OF LEFT HIP: Status: ACTIVE | Noted: 2020-11-10

## 2020-11-10 PROCEDURE — G8482 FLU IMMUNIZE ORDER/ADMIN: HCPCS | Performed by: ORTHOPAEDIC SURGERY

## 2020-11-10 PROCEDURE — 99243 OFF/OP CNSLTJ NEW/EST LOW 30: CPT | Performed by: ORTHOPAEDIC SURGERY

## 2020-11-10 PROCEDURE — G8427 DOCREV CUR MEDS BY ELIG CLIN: HCPCS | Performed by: ORTHOPAEDIC SURGERY

## 2020-11-10 PROCEDURE — G8417 CALC BMI ABV UP PARAM F/U: HCPCS | Performed by: ORTHOPAEDIC SURGERY

## 2020-11-10 NOTE — PROGRESS NOTES
CHIEF COMPLAINT: Left hip pain. History:   Joanne Jean Baptiste is a 61 y.o. male who presents today for evaluation and treatment of left hip pain / injury. Patient was referred by CHERYL Schwarz CNP for Sport Medicine consultation. This is evaluated as a personal injury. He states that the pain began years ago, but worsened about 1 1/2  years ago. Patient did not have a history of injury. Patient rates the pain as a 6/10. He states pain is located groin. Hip pain is described as aching. Pain is aggravated by walking, getting up, stairs, sitting long time. Patient states hip pain is relieved by Ice, PT. Patient does not have a history of back pain. Patient has had PT at [facility]. The patient has not taken NSAIDs. The patient has not had an intra-articular hip injection. The patient's occupation is currently unemployed. Previously did machine work. Outside reports reviewed: Finesse Peña office note 10/14/20.     Past Medical History:   Diagnosis Date    Hypertension     Prediabetes        Past Surgical History:   Procedure Laterality Date    FINGER SURGERY      2 fingers on left hand     KNEE SURGERY Left        Family History   Problem Relation Age of Onset    High Blood Pressure Mother     High Blood Pressure Father     High Blood Pressure Sister     High Blood Pressure Sister     Heart Attack Neg Hx     Stroke Neg Hx     Prostate Cancer Neg Hx        Social History     Socioeconomic History    Marital status: Single     Spouse name: None    Number of children: None    Years of education: None    Highest education level: None   Occupational History    None   Social Needs    Financial resource strain: Not hard at all   Lisandra-Asael insecurity     Worry: Never true     Inability: Never true    Transportation needs     Medical: No     Non-medical: No   Tobacco Use    Smoking status: Never Smoker    Smokeless tobacco: Never Used   Substance and Sexual Activity    Alcohol extremity. Left Hip  Trendelenburg test:  negative on Bilateral         Passive ROM:  0 degrees extension    100 degrees flexion   45 degrees external rotation   0 degrees internal rotation   Right hip: 0 degrees extension, 100 flexion, 45 degrees external rotation, 10 degrees internal rotation   MICHELLE:  5 vs 4 on right   Impingement test:  positive   Right hip: positive   Labral stress test:  positive   Right hip: positive   Greater trochanter tenderness:  negative   Pirifiormis / Gluteus medius tenderness:  negative   Iliopsoas strength:  5 / 5    Bilateral hips   Logroll:  negative   Straight-leg raise:  negative   Leg length discrepancy:  Equal      Motor exam noted and recorded, quadriceps, hamstrings, foot dorsiflexors and plantar flexors are intact 5/5 equal and symmetric. Sensation is intact grossly to light touch in the tibial, peroneal, sural, and saphenous nerve distributions bilaterally. Both feet are well perfused and sensory is intact to feet equal and symmetric. There is not  any cellulitis, skin lesions, or lymphedema noted bilaterally. Imaging:  Left hip xrays:   AP pelvis xray and AP and lateral views obtained and reviewed. They demonstrate no evidence of acute fracture, subluxation, or dislocation. Moderate joint space narrowing with large acetabular osteophyte. Right hip with mild to moderate narrowing with large acetabular osteophyte      Assessment:     Left hip osteoarthritis  HTN  Prediabetes  Marijuana abuse      Plan:     Natural history and expected course discussed. Questions answered. Ice prn. NSAIDs prn. Referral to Dr. Perlita Jay for ultrasound guided hip injection. Follow up prn.

## 2020-12-07 RX ORDER — LISINOPRIL 20 MG/1
TABLET ORAL
Qty: 90 TABLET | Refills: 0 | OUTPATIENT
Start: 2020-12-07

## 2020-12-08 ENCOUNTER — OFFICE VISIT (OUTPATIENT)
Dept: ORTHOPEDIC SURGERY | Age: 60
End: 2020-12-08
Payer: COMMERCIAL

## 2020-12-08 VITALS — HEIGHT: 66 IN | BODY MASS INDEX: 30.7 KG/M2 | WEIGHT: 191 LBS | TEMPERATURE: 97 F

## 2020-12-08 PROCEDURE — 20611 DRAIN/INJ JOINT/BURSA W/US: CPT | Performed by: ORTHOPAEDIC SURGERY

## 2020-12-08 RX ORDER — METHYLPREDNISOLONE ACETATE 40 MG/ML
80 INJECTION, SUSPENSION INTRA-ARTICULAR; INTRALESIONAL; INTRAMUSCULAR; SOFT TISSUE ONCE
Status: COMPLETED | OUTPATIENT
Start: 2020-12-08 | End: 2020-12-08

## 2020-12-08 RX ORDER — LIDOCAINE HYDROCHLORIDE 10 MG/ML
5 INJECTION, SOLUTION INFILTRATION; PERINEURAL ONCE
Status: COMPLETED | OUTPATIENT
Start: 2020-12-08 | End: 2020-12-08

## 2020-12-08 RX ADMIN — LIDOCAINE HYDROCHLORIDE 5 ML: 10 INJECTION, SOLUTION INFILTRATION; PERINEURAL at 13:26

## 2020-12-08 RX ADMIN — METHYLPREDNISOLONE ACETATE 80 MG: 40 INJECTION, SUSPENSION INTRA-ARTICULAR; INTRALESIONAL; INTRAMUSCULAR; SOFT TISSUE at 13:27

## 2020-12-08 NOTE — PATIENT INSTRUCTIONS
Impression:  left hip osteoarthritis. This does appear to be the cause of his left hip pain. Plan:  1. Injection with cortisone was recommended into  left   hip joint using ultrasound visualization. He understands the risks and benefits of the injection and describes no potential allergies. Time out was performed to verify correct person, correct procedure, and correct site. 2. Ultrasound visualization was first performed utilizing the [facility] Ultrasound unit using an 5/2 MHz Curved Probe. finding the femoral neck head junction. Next the femoral artery and vein were visualized with ultrasound medial to the femoral head. The location of the needle insertion was determined well lateral to the neurovascular structures and the site was anesthetized with 3 mL of 1% Lidocaine. The site was then prepped with ChloraPrep. A sterile cover was placed over the transducer head and the femoral head neck junction once again visualized. A 20-gauge spinal needle was then introduced under ultrasound control to the head neck junction. Once the needle was intracapsular the hip joint was injected with 2 mL  of 1% Lidocaine mixed with 2 ml of 40 mg Depo Medrol. Anjum tolerated the procedure well and  did report complete relief of  hip pain within 5 minutes of the injection. 3.  Results of the injection and prognosis were discussed with the patient. 4.  He appears to be a candidate for a left hip replacement. 5.  I have told him to discuss possible referral with Dr. Charity Arguello.       Jessica Mercado MD  12/8/2020

## 2020-12-08 NOTE — PROGRESS NOTES
ULTRASOUND GUIDED HIP INJECTION    Elisha Blake    December 8, 2020    Chief Complaint   Patient presents with    Follow-up     Left hip ultrasound injection. Referred by Dr Lito Atkins 97 °F (36.1 °C)   Ht 5' 6\" (1.676 m)   Wt 191 lb (86.6 kg)   BMI 30.83 kg/m²     Alexa Conner is a 80-year-old gentleman sent by Dr. Lissy Nichols for ultrasound directed intra-articular steroid injection of his left hip. He has approximately 2-year history of left hip pain without specific injury. He is been limited in his ability to work as a  because of the pain. The pain is in his lateral hip and groin radiating down his anterior thigh. There is no pain at rest but pain up to 8 with ambulation. His symptoms are associated with locking of the left hip. He is limited in ambulation to 1/2-hour. I have today reviewed with Elisha Blake the clinically relevant past medical history, medications, allergies, family history, and Review of Systems from the patients most recent history form, and I have documented any details relevant to today's presenting complaints in my history above. The patient's self recorded documents concerning the above have been scanned  into the chart under the \"Media\" tab. Physical Exam:   Examination of the lefthip shows a positive logroll. No Lesion of the skin is noted over the injection site    Impression:  left hip osteoarthritis. This does appear to be the cause of his left hip pain. Plan:  1. Injection with cortisone was recommended into  left   hip joint using ultrasound visualization. He understands the risks and benefits of the injection and describes no potential allergies. Time out was performed to verify correct person, correct procedure, and correct site. 2. Ultrasound visualization was first performed utilizing the [facility] Ultrasound unit using an 5/2 MHz Curved Probe. finding the femoral neck head junction.  Next the femoral artery and vein were visualized with ultrasound medial to the femoral head. The location of the needle insertion was determined well lateral to the neurovascular structures and the site was anesthetized with 3 mL of 1% Lidocaine. The site was then prepped with ChloraPrep. A sterile cover was placed over the transducer head and the femoral head neck junction once again visualized. A 20-gauge spinal needle was then introduced under ultrasound control to the head neck junction. Once the needle was intracapsular the hip joint was injected with 2 mL  of 1% Lidocaine mixed with 2 ml of 40 mg Depo Medrol. Anjum tolerated the procedure well and  did report complete relief of  hip pain within 5 minutes of the injection. 3.  Results of the injection and prognosis were discussed with the patient. 4.  He appears to be a candidate for a left hip replacement. 5.  I have told him to discuss possible referral with Dr. Ria Norton.       Harmony Bal MD  12/8/2020

## 2020-12-11 ASSESSMENT — ENCOUNTER SYMPTOMS
SHORTNESS OF BREATH: 0
DIARRHEA: 0
NAUSEA: 0
COUGH: 0
CONSTIPATION: 0
ABDOMINAL PAIN: 0
VOMITING: 0
WHEEZING: 0

## 2020-12-11 NOTE — PROGRESS NOTES
Office Visit   12/15/2020    Subjective:  Chief Complaint   Patient presents with    Hypertension     f/u for BP -- has only taken 20 mgs the last 3 weeks      HPI:   Marisela Gan is a 61 y.o. male who presents to the clinic today for follow up. HTN-Prescribed lisinopril 30 mg daily without side effects- ran out and took 20 mg daily, but restarted 30 mg daily recently. Does not take his BP at home. Asymptomatic. No chest pain, palpitations, shortness of breath, trouble breathing, lightheadedness, dizziness or blurred vision.     Prediabetes- Not exercising. States his diet is getting better. States he eats sweets a lot.     Marijuana abuse- using 1-2x/day. States he is aware he needs to stop, but not ready at this time.     Left hip pain- Xrays completed. Performed PT. Referred to ortho. Had an injection and reports pain is improving- however, not resolved.      Had colonoscopy. Review of Systems   Constitutional: Negative for chills, fatigue, fever and unexpected weight change. Eyes: Negative for visual disturbance. Respiratory: Negative for cough, shortness of breath and wheezing. Cardiovascular: Negative for chest pain, palpitations and leg swelling. Gastrointestinal: Negative for abdominal pain, constipation, diarrhea, nausea and vomiting. Musculoskeletal:        Chronic left hip pains   Skin: Negative for pallor and rash. Neurological: Negative for dizziness, weakness, light-headedness, numbness and headaches.      No Known Allergies    Current Outpatient Rx   Medication Sig Dispense Refill    lisinopril (PRINIVIL;ZESTRIL) 30 MG tablet Take 1 tablet by mouth daily 90 tablet 0     Patient Active Problem List   Diagnosis    Hypertension    Prediabetes    Primary osteoarthritis of left hip      Wt Readings from Last 3 Encounters:   12/15/20 198 lb (89.8 kg)   12/08/20 191 lb (86.6 kg)   11/10/20 191 lb (86.6 kg)     BP Readings from Last 3 Encounters:   12/15/20 126/60 10/14/20 (!) 138/94   07/17/20 122/86     The ASCVD Risk score (Sarah Lanier, et al., 2013) failed to calculate for the following reasons:    Cannot find a previous HDL lab    Cannot find a previous total cholesterol lab  ^not completed. Objective/Physical Exam:  /60   Pulse 80   Temp 97 °F (36.1 °C)   Wt 198 lb (89.8 kg)   BMI 31.96 kg/m²   Body mass index is 31.96 kg/m². Physical Exam  Vitals signs reviewed. Constitutional:       General: He is not in acute distress. Appearance: He is well-developed. He is not diaphoretic. HENT:      Head: Normocephalic and atraumatic. Eyes:      Pupils: Pupils are equal, round, and reactive to light. Cardiovascular:      Rate and Rhythm: Normal rate and regular rhythm. Pulmonary:      Effort: Pulmonary effort is normal. No respiratory distress. Breath sounds: Normal breath sounds. No wheezing or rales. Chest:      Chest wall: No tenderness. Abdominal:      General: Bowel sounds are normal.      Palpations: Abdomen is soft. Skin:     General: Skin is warm and dry. Coloration: Skin is not pale. Findings: No erythema or rash. Neurological:      Mental Status: He is alert and oriented to person, place, and time. Coordination: Coordination normal.   Psychiatric:         Mood and Affect: Mood normal.       Assessment and Plan:  Reyna Kincaid was seen today for hypertension. Diagnoses and all orders for this visit:    Essential hypertension  -     BP stable today. Asymptomatic. No chest pain, palpitations, shortness of breath, trouble breathing, lightheadedness, dizziness or blurred vision.  - Lifestyle modifications such as exercise, weight loss and healthy diet encouraged and reviewed with the pt. - Continue current regimen  - lisinopril (PRINIVIL;ZESTRIL) 30 MG tablet;  Take 1 tablet by mouth daily - refilled today  -     CBC Auto Differential; Future  - BMP

## 2020-12-14 RX ORDER — LISINOPRIL 30 MG/1
TABLET ORAL
Qty: 15 TABLET | Refills: 0 | Status: SHIPPED | OUTPATIENT
Start: 2020-12-14 | End: 2020-12-15 | Stop reason: SDUPTHER

## 2020-12-15 ENCOUNTER — OFFICE VISIT (OUTPATIENT)
Dept: INTERNAL MEDICINE CLINIC | Age: 60
End: 2020-12-15
Payer: COMMERCIAL

## 2020-12-15 VITALS
DIASTOLIC BLOOD PRESSURE: 60 MMHG | SYSTOLIC BLOOD PRESSURE: 126 MMHG | TEMPERATURE: 97 F | HEART RATE: 80 BPM | BODY MASS INDEX: 31.96 KG/M2 | WEIGHT: 198 LBS

## 2020-12-15 PROCEDURE — 99214 OFFICE O/P EST MOD 30 MIN: CPT | Performed by: NURSE PRACTITIONER

## 2020-12-15 PROCEDURE — 1036F TOBACCO NON-USER: CPT | Performed by: NURSE PRACTITIONER

## 2020-12-15 PROCEDURE — G8427 DOCREV CUR MEDS BY ELIG CLIN: HCPCS | Performed by: NURSE PRACTITIONER

## 2020-12-15 PROCEDURE — G8482 FLU IMMUNIZE ORDER/ADMIN: HCPCS | Performed by: NURSE PRACTITIONER

## 2020-12-15 PROCEDURE — 3017F COLORECTAL CA SCREEN DOC REV: CPT | Performed by: NURSE PRACTITIONER

## 2020-12-15 PROCEDURE — G8417 CALC BMI ABV UP PARAM F/U: HCPCS | Performed by: NURSE PRACTITIONER

## 2020-12-15 RX ORDER — LISINOPRIL 30 MG/1
30 TABLET ORAL DAILY
Qty: 90 TABLET | Refills: 0 | Status: SHIPPED | OUTPATIENT
Start: 2020-12-15 | End: 2021-03-11

## 2020-12-15 NOTE — PATIENT INSTRUCTIONS
Please get your fasting lab work (no food or drink for 10-12 hours prior besides water) completed M-F 830a-430p at our office. [facility] lab has walk-in hours available as well - they are open Saturday 7a-3p - no appointment is needed. We will call with your results.

## 2021-03-11 DIAGNOSIS — I10 ESSENTIAL HYPERTENSION: ICD-10-CM

## 2021-03-11 RX ORDER — LISINOPRIL 30 MG/1
TABLET ORAL
Qty: 90 TABLET | Refills: 0 | Status: SHIPPED | OUTPATIENT
Start: 2021-03-11 | End: 2021-05-11 | Stop reason: SDUPTHER

## 2021-03-11 NOTE — TELEPHONE ENCOUNTER
Attempted to call patient, no answer. Unable to leave voice message- it did not , but continuously beeped. 65

## 2021-04-30 ENCOUNTER — TELEPHONE (OUTPATIENT)
Dept: INTERNAL MEDICINE CLINIC | Age: 61
End: 2021-04-30

## 2021-04-30 NOTE — TELEPHONE ENCOUNTER
Pt notified that Juan C Salines sent over a 3 month supply to the pharmacy on 3/11. Pt will check with pharmacy. Is past due for 3 month apt, scheduled for 6/6.

## 2021-05-11 ENCOUNTER — OFFICE VISIT (OUTPATIENT)
Dept: INTERNAL MEDICINE CLINIC | Age: 61
End: 2021-05-11
Payer: COMMERCIAL

## 2021-05-11 VITALS
OXYGEN SATURATION: 96 % | WEIGHT: 204.4 LBS | BODY MASS INDEX: 32.99 KG/M2 | DIASTOLIC BLOOD PRESSURE: 84 MMHG | HEART RATE: 86 BPM | SYSTOLIC BLOOD PRESSURE: 136 MMHG

## 2021-05-11 DIAGNOSIS — I10 ESSENTIAL HYPERTENSION: Primary | ICD-10-CM

## 2021-05-11 DIAGNOSIS — M25.552 HIP PAIN, LEFT: ICD-10-CM

## 2021-05-11 DIAGNOSIS — F12.10 MARIJUANA ABUSE: ICD-10-CM

## 2021-05-11 DIAGNOSIS — R73.03 PREDIABETES: ICD-10-CM

## 2021-05-11 PROCEDURE — 3017F COLORECTAL CA SCREEN DOC REV: CPT | Performed by: NURSE PRACTITIONER

## 2021-05-11 PROCEDURE — G8427 DOCREV CUR MEDS BY ELIG CLIN: HCPCS | Performed by: NURSE PRACTITIONER

## 2021-05-11 PROCEDURE — 1036F TOBACCO NON-USER: CPT | Performed by: NURSE PRACTITIONER

## 2021-05-11 PROCEDURE — G8417 CALC BMI ABV UP PARAM F/U: HCPCS | Performed by: NURSE PRACTITIONER

## 2021-05-11 PROCEDURE — 99213 OFFICE O/P EST LOW 20 MIN: CPT | Performed by: NURSE PRACTITIONER

## 2021-05-11 RX ORDER — LISINOPRIL 30 MG/1
TABLET ORAL
Qty: 90 TABLET | Refills: 0 | Status: SHIPPED | OUTPATIENT
Start: 2021-05-11 | End: 2021-08-31 | Stop reason: SDUPTHER

## 2021-05-11 ASSESSMENT — PATIENT HEALTH QUESTIONNAIRE - PHQ9
2. FEELING DOWN, DEPRESSED OR HOPELESS: 0
SUM OF ALL RESPONSES TO PHQ QUESTIONS 1-9: 0
1. LITTLE INTEREST OR PLEASURE IN DOING THINGS: 0
SUM OF ALL RESPONSES TO PHQ QUESTIONS 1-9: 0
SUM OF ALL RESPONSES TO PHQ QUESTIONS 1-9: 0

## 2021-05-11 ASSESSMENT — ENCOUNTER SYMPTOMS
COUGH: 0
SHORTNESS OF BREATH: 0
WHEEZING: 0

## 2021-05-11 NOTE — PROGRESS NOTES
Office Visit  5/11/2021    Subjective:  Chief Complaint   Patient presents with    3 Month Follow-Up     HTN, preDM    Hip Pain     HPI:   Rhoda Carpenter is a 61 y.o. male who presents to the clinic today for follow up. HTN-Prescribed lisinopril 30 mg daily without side effects. Not taking BP at home. Asymptomatic. Denies chest pain, palpitations, shortness of breath, trouble breathing, lightheadedness, dizziness or blurred vision.     Prediabetes- Not exercising. States his diet is \"honestly crappy. \" States he will eat one good meal per day, pt states- \"but I am notorious for sweets. \"     Marijuana abuse- using 0-1x/day. States he is aware he needs to stop, but not ready at this time.     Left hip pain- Xrays completed. Performed PT. Referred to ortho. Had an injection. Pain not resolved.     Review of Systems   Constitutional: Negative for chills, fatigue, fever and unexpected weight change. Eyes: Negative for visual disturbance. Respiratory: Negative for cough, shortness of breath and wheezing. Cardiovascular: Negative for chest pain, palpitations and leg swelling. Musculoskeletal:        Left hip pains   Skin: Negative for pallor and rash. Neurological: Negative for dizziness, weakness, light-headedness, numbness and headaches.      No Known Allergies    Current Outpatient Rx   Medication Sig Dispense Refill    lisinopril (PRINIVIL;ZESTRIL) 30 MG tablet TAKE 1 TABLET BY MOUTH EVERY DAY 90 tablet 0     Patient Active Problem List   Diagnosis    Hypertension    Prediabetes    Primary osteoarthritis of left hip      Wt Readings from Last 3 Encounters:   05/11/21 204 lb 6.4 oz (92.7 kg)   12/15/20 198 lb (89.8 kg)   12/08/20 191 lb (86.6 kg)     BP Readings from Last 3 Encounters:   05/11/21 136/84   12/15/20 126/60   10/14/20 (!) 138/94     The ASCVD Risk score (Tyler Romberg., et al., 2013) failed to calculate for the following reasons:    Cannot find a previous HDL lab    Cannot find a previous

## 2021-08-31 ENCOUNTER — OFFICE VISIT (OUTPATIENT)
Dept: INTERNAL MEDICINE CLINIC | Age: 61
End: 2021-08-31
Payer: COMMERCIAL

## 2021-08-31 VITALS
BODY MASS INDEX: 30.57 KG/M2 | DIASTOLIC BLOOD PRESSURE: 84 MMHG | WEIGHT: 190.2 LBS | HEIGHT: 66 IN | SYSTOLIC BLOOD PRESSURE: 138 MMHG | OXYGEN SATURATION: 97 % | HEART RATE: 72 BPM

## 2021-08-31 DIAGNOSIS — Z00.00 ANNUAL PHYSICAL EXAM: ICD-10-CM

## 2021-08-31 DIAGNOSIS — Z11.4 SCREENING FOR HIV (HUMAN IMMUNODEFICIENCY VIRUS): ICD-10-CM

## 2021-08-31 DIAGNOSIS — M25.552 HIP PAIN, LEFT: ICD-10-CM

## 2021-08-31 DIAGNOSIS — Z13.220 SCREENING, LIPID: ICD-10-CM

## 2021-08-31 DIAGNOSIS — Z23 NEED FOR TETANUS BOOSTER: Primary | ICD-10-CM

## 2021-08-31 DIAGNOSIS — R73.03 PREDIABETES: ICD-10-CM

## 2021-08-31 DIAGNOSIS — I10 ESSENTIAL HYPERTENSION: ICD-10-CM

## 2021-08-31 DIAGNOSIS — F12.10 MARIJUANA ABUSE: ICD-10-CM

## 2021-08-31 PROCEDURE — 90471 IMMUNIZATION ADMIN: CPT | Performed by: NURSE PRACTITIONER

## 2021-08-31 PROCEDURE — 99396 PREV VISIT EST AGE 40-64: CPT | Performed by: NURSE PRACTITIONER

## 2021-08-31 PROCEDURE — 90714 TD VACC NO PRESV 7 YRS+ IM: CPT | Performed by: NURSE PRACTITIONER

## 2021-08-31 RX ORDER — LISINOPRIL 30 MG/1
TABLET ORAL
Qty: 90 TABLET | Refills: 1 | Status: SHIPPED | OUTPATIENT
Start: 2021-08-31 | End: 2022-03-01 | Stop reason: SDUPTHER

## 2021-08-31 SDOH — ECONOMIC STABILITY: FOOD INSECURITY: WITHIN THE PAST 12 MONTHS, YOU WORRIED THAT YOUR FOOD WOULD RUN OUT BEFORE YOU GOT MONEY TO BUY MORE.: NEVER TRUE

## 2021-08-31 SDOH — ECONOMIC STABILITY: FOOD INSECURITY: WITHIN THE PAST 12 MONTHS, THE FOOD YOU BOUGHT JUST DIDN'T LAST AND YOU DIDN'T HAVE MONEY TO GET MORE.: NEVER TRUE

## 2021-08-31 ASSESSMENT — ENCOUNTER SYMPTOMS
SORE THROAT: 0
WHEEZING: 0
SHORTNESS OF BREATH: 0
ABDOMINAL PAIN: 0
NAUSEA: 0
VOMITING: 0
DIARRHEA: 0
SINUS PAIN: 0
CONSTIPATION: 0
COUGH: 0

## 2021-08-31 ASSESSMENT — PATIENT HEALTH QUESTIONNAIRE - PHQ9
SUM OF ALL RESPONSES TO PHQ QUESTIONS 1-9: 0
SUM OF ALL RESPONSES TO PHQ9 QUESTIONS 1 & 2: 0
SUM OF ALL RESPONSES TO PHQ QUESTIONS 1-9: 0
SUM OF ALL RESPONSES TO PHQ QUESTIONS 1-9: 0
1. LITTLE INTEREST OR PLEASURE IN DOING THINGS: 0
2. FEELING DOWN, DEPRESSED OR HOPELESS: 0

## 2021-08-31 ASSESSMENT — SOCIAL DETERMINANTS OF HEALTH (SDOH): HOW HARD IS IT FOR YOU TO PAY FOR THE VERY BASICS LIKE FOOD, HOUSING, MEDICAL CARE, AND HEATING?: NOT HARD AT ALL

## 2021-08-31 NOTE — PROGRESS NOTES
Annual Exam Office Visit  8/31/2021    Subjective:  Chief Complaint   Patient presents with    Annual Exam     HPI:  Yasmine Wilson is a 61 y.o. male who presents to the clinic today for an annual exam.    HTN-Prescribed lisinopril 30 mg daily without side effects. Not monitoring BP at home. Asymptomatic. Denies chest pain, palpitations, shortness of breath, trouble breathing, lightheadedness, dizziness or blurred vision.     Prediabetes- Not exercising. States his diet is \"better than the last time I talked to you. \"     Marijuana abuse- using 0-2x/day. Not ready at this time.     Left hip pain- Xrays completed. Performed PT. Referred to ortho. Had a cortisone injection. Has not f/u with ortho. States he is \"leaving it alone for a minute. \"     Not working. He has family in town. 2 children. For fun, he plays with his grandchildren- 15 of them. One of his daughter has 8 children and he helps care for them. Review of Systems   Constitutional: Negative for chills, fatigue and fever. HENT: Negative for congestion, postnasal drip, sinus pain and sore throat. Respiratory: Negative for cough, shortness of breath and wheezing. Cardiovascular: Negative for chest pain, palpitations and leg swelling. Gastrointestinal: Negative for abdominal pain, constipation, diarrhea, nausea and vomiting. Genitourinary: Negative for dysuria, frequency, hematuria and urgency. Musculoskeletal:        Left hip pain   Skin: Negative for pallor and rash. Neurological: Negative for dizziness, weakness, light-headedness, numbness and headaches. Psychiatric/Behavioral: Negative for dysphoric mood, sleep disturbance and suicidal ideas. The patient is not nervous/anxious.       No Known Allergies     Family History   Problem Relation Age of Onset    High Blood Pressure Mother     High Blood Pressure Father     High Blood Pressure Sister     High Blood Pressure Sister     Heart Attack Neg Hx     Stroke Neg Hx     Prostate Cancer Neg Hx      Current Outpatient Rx   Medication Sig Dispense Refill    lisinopril (PRINIVIL;ZESTRIL) 30 MG tablet TAKE 1 TABLET BY MOUTH EVERY DAY 90 tablet 1     Social History     Socioeconomic History    Marital status: Single     Spouse name: Not on file    Number of children: Not on file    Years of education: Not on file    Highest education level: Not on file   Occupational History    Not on file   Tobacco Use    Smoking status: Never Smoker    Smokeless tobacco: Never Used   Vaping Use    Vaping Use: Never used   Substance and Sexual Activity    Alcohol use: Not Currently     Comment: a few drinks every few years    Drug use: Yes     Types: Marijuana     Comment: 0-2x/day - 1 gram per day on average    Sexual activity: Not on file     Comment: single    Other Topics Concern    Not on file   Social History Narrative    Not working. He has family in town. 2 children. For fun, he plays with his grandchildren- 15 of them. Social Determinants of Health     Financial Resource Strain: Low Risk     Difficulty of Paying Living Expenses: Not hard at all   Food Insecurity: No Food Insecurity    Worried About Running Out of Food in the Last Year: Never true    Harlan of Food in the Last Year: Never true   Transportation Needs:     Lack of Transportation (Medical):      Lack of Transportation (Non-Medical):    Physical Activity:     Days of Exercise per Week:     Minutes of Exercise per Session:    Stress:     Feeling of Stress :    Social Connections:     Frequency of Communication with Friends and Family:     Frequency of Social Gatherings with Friends and Family:     Attends [employer] Services:     Active Member of Clubs or Organizations:     Attends Club or Organization Meetings:     Marital Status:    Intimate Partner Violence:     Fear of Current or Ex-Partner:     Emotionally Abused:     Physically Abused:     Sexually Abused:      Past Medical History:   Diagnosis Date    Hypertension     Prediabetes      Patient Active Problem List   Diagnosis    Hypertension    Prediabetes    Primary osteoarthritis of left hip    Marijuana abuse      Wt Readings from Last 3 Encounters:   08/31/21 190 lb 3.2 oz (86.3 kg)   05/11/21 204 lb 6.4 oz (92.7 kg)   12/15/20 198 lb (89.8 kg)     BP Readings from Last 3 Encounters:   08/31/21 138/84   05/11/21 136/84   12/15/20 126/60     The ASCVD Risk score (Chayo Martin et al., 2013) failed to calculate for the following reasons:    Cannot find a previous HDL lab    Cannot find a previous total cholesterol lab  ^ordered today    PHQ-9 Total Score: 0 (8/31/2021 11:39 AM)    Objective/Physical Exam:  /84   Pulse 72   Ht 5' 6\" (1.676 m)   Wt 190 lb 3.2 oz (86.3 kg)   SpO2 97%   BMI 30.70 kg/m²   Body mass index is 30.7 kg/m². Physical Exam  Vitals reviewed. Constitutional:       General: He is not in acute distress. Appearance: He is well-developed. He is not diaphoretic. HENT:      Head: Normocephalic and atraumatic. Right Ear: Tympanic membrane and external ear normal.      Left Ear: Tympanic membrane and external ear normal.   Eyes:      Pupils: Pupils are equal, round, and reactive to light. Cardiovascular:      Rate and Rhythm: Normal rate and regular rhythm. Pulmonary:      Effort: Pulmonary effort is normal. No respiratory distress. Breath sounds: Normal breath sounds. No wheezing or rales. Chest:      Chest wall: No tenderness. Abdominal:      General: Bowel sounds are normal. There is no distension. Palpations: Abdomen is soft. Tenderness: There is no abdominal tenderness. There is no guarding. Skin:     General: Skin is warm and dry. Neurological:      Mental Status: He is alert and oriented to person, place, and time.       Coordination: Coordination normal.   Psychiatric:         Mood and Affect: Mood normal.       Assessment and Plan:  Nancy Parsons was seen today for annual exam.    Diagnoses and all orders for this visit:    Annual physical exam  -     COMPREHENSIVE METABOLIC PANEL; Future  -     CBC Auto Differential; Future  - Lifestyle modifications such as exercise, weight loss and healthy diet encouraged and reviewed with the pt. - Labs today. Compliance encouraged. Essential hypertension  -    Blood pressure to goal today. Asymptomatic. Denies side effects.  - Continue current regimen  - lisinopril (PRINIVIL;ZESTRIL) 30 MG tablet; TAKE 1 TABLET BY MOUTH EVERY DAY  - CMP    Prediabetes  -     HEMOGLOBIN A1C; Future  - Lifestyle modifications such as exercise, weight loss and healthy diet encouraged and reviewed with the pt. Marijuana abuse   - Cessation encouraged    Hip pain, left   - Continue with orthopedics. Phone number provided    Screening, lipid  -     Lipid, Fasting; Future    Screening for HIV (human immunodeficiency virus)  -    Asymptomatic. Patient agreeable  - HIV Screen; Future    Return in about 6 months (around 2/28/2022) for HTN f/u, or sooner if needed. Pt will call if symptoms worsen or fail to improve. All questions answered. Pt states no further questions or concerns at this time.    Electronically signed by: CHERYL Cheatham CNP 08/31/21

## 2021-08-31 NOTE — PATIENT INSTRUCTIONS
Please get your fasting lab work (no food or drink for 10-12 hours prior besides water) completed M-F 830a-430p at our office. [facility] lab has walk-in hours available as well - they are open Saturday 7a-3p - no appointment is needed. We will call with your results.     Ortho: Dr. Bette Santiago:    Cornell Campa) [phone]

## 2022-02-01 ENCOUNTER — TELEPHONE (OUTPATIENT)
Dept: INTERNAL MEDICINE CLINIC | Age: 62
End: 2022-02-01

## 2022-02-01 NOTE — TELEPHONE ENCOUNTER
Pt calling you had referred him to someone for his hip but he doesn't remember who that was----please call pt to let him know who---Thanks

## 2022-03-01 ENCOUNTER — OFFICE VISIT (OUTPATIENT)
Dept: INTERNAL MEDICINE CLINIC | Age: 62
End: 2022-03-01
Payer: COMMERCIAL

## 2022-03-01 VITALS
WEIGHT: 197 LBS | DIASTOLIC BLOOD PRESSURE: 82 MMHG | BODY MASS INDEX: 31.8 KG/M2 | HEART RATE: 68 BPM | OXYGEN SATURATION: 98 % | SYSTOLIC BLOOD PRESSURE: 128 MMHG

## 2022-03-01 DIAGNOSIS — Z11.4 SCREENING FOR HIV (HUMAN IMMUNODEFICIENCY VIRUS): ICD-10-CM

## 2022-03-01 DIAGNOSIS — I10 ESSENTIAL HYPERTENSION: Primary | ICD-10-CM

## 2022-03-01 DIAGNOSIS — M25.552 HIP PAIN, LEFT: ICD-10-CM

## 2022-03-01 DIAGNOSIS — F12.90 MARIJUANA USE: ICD-10-CM

## 2022-03-01 DIAGNOSIS — Z13.220 SCREENING, LIPID: ICD-10-CM

## 2022-03-01 DIAGNOSIS — R73.03 PREDIABETES: ICD-10-CM

## 2022-03-01 PROCEDURE — 3017F COLORECTAL CA SCREEN DOC REV: CPT | Performed by: NURSE PRACTITIONER

## 2022-03-01 PROCEDURE — G8417 CALC BMI ABV UP PARAM F/U: HCPCS | Performed by: NURSE PRACTITIONER

## 2022-03-01 PROCEDURE — 99214 OFFICE O/P EST MOD 30 MIN: CPT | Performed by: NURSE PRACTITIONER

## 2022-03-01 PROCEDURE — G8484 FLU IMMUNIZE NO ADMIN: HCPCS | Performed by: NURSE PRACTITIONER

## 2022-03-01 PROCEDURE — 1036F TOBACCO NON-USER: CPT | Performed by: NURSE PRACTITIONER

## 2022-03-01 PROCEDURE — G8427 DOCREV CUR MEDS BY ELIG CLIN: HCPCS | Performed by: NURSE PRACTITIONER

## 2022-03-01 RX ORDER — LISINOPRIL 30 MG/1
TABLET ORAL
Qty: 30 TABLET | Refills: 0 | Status: SHIPPED | OUTPATIENT
Start: 2022-03-01 | End: 2022-04-12 | Stop reason: SDUPTHER

## 2022-03-01 ASSESSMENT — ENCOUNTER SYMPTOMS
VOMITING: 0
CONSTIPATION: 0
NAUSEA: 0
COUGH: 0
SHORTNESS OF BREATH: 0
ABDOMINAL PAIN: 0
DIARRHEA: 0
WHEEZING: 0

## 2022-03-01 NOTE — PATIENT INSTRUCTIONS
Please get your fasting lab work (no food or drink for 10-12 hours prior besides water) completed M-F 730a-4p at our office. [facility] lab has walk-in hours available as well - they are open Saturday 7a-3p - no appointment is needed. We will call with your results. Bring covid-19 vaccine card to the office.

## 2022-03-01 NOTE — PROGRESS NOTES
Office Visit   3/1/2022    Subjective:  Chief Complaint   Patient presents with    Hypertension     HPI:   Junito Yung is a 64 y.o. male who presents to the clinic today for follow up. HTN-Prescribed lisinopril 30 mg daily - states he is taking this without side effects. Not monitoring BP at home. Asymptomatic. Denies chest pain, palpitations, shortness of breath, trouble breathing, lightheadedness, dizziness or blurred vision. Prediabetes- No formal exercise. States his diet is \"better. I am trying to eat fruits, vegetables and all that good stuff. I do eat candy. I ate half a bag of jelly beans last night. \"    Marijuana abuse- using 0-1x/day. Smokes 3 grams per week on average. Not ready to quit at this time. Left hip pain- Xrays completed. Performed PT. Saw ortho- but states he will f/u after COVID-19 levels improve. States he does not like medication, so he is not taking PRN medications. Daughter has 8 children. States she recently had a miscarriage. States he helps care for all of the children. Review of Systems   Constitutional: Negative for chills, fatigue and fever. Respiratory: Negative for cough, shortness of breath and wheezing. Cardiovascular: Negative for chest pain, palpitations and leg swelling. Gastrointestinal: Negative for abdominal pain, constipation, diarrhea, nausea and vomiting. Musculoskeletal:        Chronic hip pains   Skin: Negative for pallor and rash. Neurological: Negative for dizziness, weakness, light-headedness, numbness and headaches.      No Known Allergies    Current Outpatient Rx   Medication Sig Dispense Refill    lisinopril (PRINIVIL;ZESTRIL) 30 MG tablet TAKE 1 TABLET BY MOUTH EVERY DAY 30 tablet 0     Patient Active Problem List   Diagnosis    Hypertension    Prediabetes    Primary osteoarthritis of left hip    Marijuana abuse      Wt Readings from Last 3 Encounters:   03/01/22 197 lb (89.4 kg)   08/31/21 190 lb 3.2 oz (86.3 kg)   05/11/21 204 lb 6.4 oz (92.7 kg)     BP Readings from Last 3 Encounters:   03/01/22 128/82   08/31/21 138/84   05/11/21 136/84     The ASCVD Risk score (Milli Marin, et al., 2013) failed to calculate for the following reasons:    Cannot find a previous HDL lab    Cannot find a previous total cholesterol lab  ^ordered and not completed. Objective/Physical Exam:  /82 (Site: Left Upper Arm, Position: Sitting)   Pulse 68   Wt 197 lb (89.4 kg)   SpO2 98%   BMI 31.80 kg/m²   Body mass index is 31.8 kg/m². Physical Exam  Vitals reviewed. Constitutional:       General: He is not in acute distress. Appearance: He is well-developed. He is not diaphoretic. HENT:      Head: Normocephalic and atraumatic. Eyes:      Pupils: Pupils are equal, round, and reactive to light. Cardiovascular:      Rate and Rhythm: Normal rate and regular rhythm. Pulmonary:      Effort: Pulmonary effort is normal. No respiratory distress. Breath sounds: Normal breath sounds. No wheezing or rales. Chest:      Chest wall: No tenderness. Skin:     General: Skin is warm and dry. Neurological:      Mental Status: He is alert and oriented to person, place, and time. Coordination: Coordination normal.   Psychiatric:         Mood and Affect: Mood normal.       Assessment and Plan:  London Sandoval was seen today for hypertension. Diagnoses and all orders for this visit:    Essential hypertension  -     BP stable today. Asymptomatic. Denies side effects.  -  Fasting, routine labs ordered 12/20 and 8/21 not completed. Pt states \"I forgot all about it until I remembered my appointment today. \" States he will get labs this Thursday- he has a ride scheduled. - Lifestyle modifications such as exercise, weight loss and healthy diet encouraged and reviewed with the pt.   - Continue current medication regimen  - lisinopril (PRINIVIL;ZESTRIL) 30 MG tablet; TAKE 1 TABLET BY MOUTH EVERY DAY-refilled today  -     Comprehensive Metabolic Panel; Future  -     CBC with Auto Differential; Future    Prediabetes  -     Hemoglobin A1C; Future  - Lifestyle modifications such as exercise, weight loss and healthy diet encouraged and reviewed with the pt. Hip pain, left   - Continue with ortho    Marijuana use   - Cessation recommended. Screening, lipid  -     Lipid, Fasting; Future    Screening for HIV (human immunodeficiency virus)  -    Asymptomatic. Pt agreeable. - HIV Screen; Future    States he was vaccinated and boosted for COVID-19. Bringing COVID-19 card to the next visit. Return in about 3 months (around 6/1/2022) for HTN f/u, or sooner if needed. Pt will call if symptoms worsen or fail to improve. All questions answered. Pt states no further questions or concerns at this time.    Electronically signed by: CHERYL Pete - MP 03/01/22

## 2022-04-12 ENCOUNTER — OFFICE VISIT (OUTPATIENT)
Dept: INTERNAL MEDICINE CLINIC | Age: 62
End: 2022-04-12
Payer: COMMERCIAL

## 2022-04-12 VITALS
DIASTOLIC BLOOD PRESSURE: 78 MMHG | BODY MASS INDEX: 30.79 KG/M2 | HEIGHT: 66 IN | HEART RATE: 76 BPM | WEIGHT: 191.6 LBS | SYSTOLIC BLOOD PRESSURE: 118 MMHG

## 2022-04-12 DIAGNOSIS — R73.03 PREDIABETES: ICD-10-CM

## 2022-04-12 DIAGNOSIS — I10 ESSENTIAL HYPERTENSION: Primary | ICD-10-CM

## 2022-04-12 DIAGNOSIS — I10 ESSENTIAL HYPERTENSION: ICD-10-CM

## 2022-04-12 DIAGNOSIS — Z13.220 SCREENING, LIPID: ICD-10-CM

## 2022-04-12 DIAGNOSIS — Z11.4 SCREENING FOR HIV (HUMAN IMMUNODEFICIENCY VIRUS): ICD-10-CM

## 2022-04-12 LAB
A/G RATIO: 1.8 (ref 1.1–2.2)
ALBUMIN SERPL-MCNC: 4.3 G/DL (ref 3.4–5)
ALP BLD-CCNC: 104 U/L (ref 40–129)
ALT SERPL-CCNC: 11 U/L (ref 10–40)
ANION GAP SERPL CALCULATED.3IONS-SCNC: 15 MMOL/L (ref 3–16)
AST SERPL-CCNC: 12 U/L (ref 15–37)
BASOPHILS ABSOLUTE: 0 K/UL (ref 0–0.2)
BASOPHILS RELATIVE PERCENT: 0.6 %
BILIRUB SERPL-MCNC: 0.7 MG/DL (ref 0–1)
BUN BLDV-MCNC: 3 MG/DL (ref 7–20)
CALCIUM SERPL-MCNC: 9.5 MG/DL (ref 8.3–10.6)
CHLORIDE BLD-SCNC: 103 MMOL/L (ref 99–110)
CHOLESTEROL, FASTING: 213 MG/DL (ref 0–199)
CO2: 23 MMOL/L (ref 21–32)
CREAT SERPL-MCNC: 0.7 MG/DL (ref 0.8–1.3)
EOSINOPHILS ABSOLUTE: 0.1 K/UL (ref 0–0.6)
EOSINOPHILS RELATIVE PERCENT: 0.8 %
GFR AFRICAN AMERICAN: >60
GFR NON-AFRICAN AMERICAN: >60
GLUCOSE BLD-MCNC: 92 MG/DL (ref 70–99)
HCT VFR BLD CALC: 48.4 % (ref 40.5–52.5)
HDLC SERPL-MCNC: 28 MG/DL (ref 40–60)
HEMOGLOBIN: 16.1 G/DL (ref 13.5–17.5)
LDL CHOLESTEROL CALCULATED: 165 MG/DL
LYMPHOCYTES ABSOLUTE: 2.3 K/UL (ref 1–5.1)
LYMPHOCYTES RELATIVE PERCENT: 31.1 %
MCH RBC QN AUTO: 33.1 PG (ref 26–34)
MCHC RBC AUTO-ENTMCNC: 33.3 G/DL (ref 31–36)
MCV RBC AUTO: 99.3 FL (ref 80–100)
MONOCYTES ABSOLUTE: 0.8 K/UL (ref 0–1.3)
MONOCYTES RELATIVE PERCENT: 10.3 %
NEUTROPHILS ABSOLUTE: 4.2 K/UL (ref 1.7–7.7)
NEUTROPHILS RELATIVE PERCENT: 57.2 %
PDW BLD-RTO: 14.7 % (ref 12.4–15.4)
PLATELET # BLD: 303 K/UL (ref 135–450)
PMV BLD AUTO: 8.1 FL (ref 5–10.5)
POTASSIUM SERPL-SCNC: 4.5 MMOL/L (ref 3.5–5.1)
RBC # BLD: 4.87 M/UL (ref 4.2–5.9)
SODIUM BLD-SCNC: 141 MMOL/L (ref 136–145)
TOTAL PROTEIN: 6.7 G/DL (ref 6.4–8.2)
TRIGLYCERIDE, FASTING: 100 MG/DL (ref 0–150)
VLDLC SERPL CALC-MCNC: 20 MG/DL
WBC # BLD: 7.4 K/UL (ref 4–11)

## 2022-04-12 PROCEDURE — 1036F TOBACCO NON-USER: CPT | Performed by: NURSE PRACTITIONER

## 2022-04-12 PROCEDURE — 99213 OFFICE O/P EST LOW 20 MIN: CPT | Performed by: NURSE PRACTITIONER

## 2022-04-12 PROCEDURE — G8417 CALC BMI ABV UP PARAM F/U: HCPCS | Performed by: NURSE PRACTITIONER

## 2022-04-12 PROCEDURE — 3017F COLORECTAL CA SCREEN DOC REV: CPT | Performed by: NURSE PRACTITIONER

## 2022-04-12 PROCEDURE — G8427 DOCREV CUR MEDS BY ELIG CLIN: HCPCS | Performed by: NURSE PRACTITIONER

## 2022-04-12 RX ORDER — LISINOPRIL 30 MG/1
TABLET ORAL
Qty: 30 TABLET | Refills: 0 | Status: SHIPPED | OUTPATIENT
Start: 2022-04-12 | End: 2022-07-14 | Stop reason: SDUPTHER

## 2022-04-12 ASSESSMENT — ENCOUNTER SYMPTOMS
COUGH: 0
SHORTNESS OF BREATH: 0
WHEEZING: 0

## 2022-04-12 NOTE — PROGRESS NOTES
Office Visit   4/12/2022    Subjective:  Chief Complaint   Patient presents with    Hypertension     HPI:  Ruth Tomlin is a 64 y.o. male who presents to the clinic today for follow up.     HTN-Prescribed lisinopril 30 mg daily - states he is taking this medication without side effects. Not monitoring BP at home. Asymptomatic. Denies chest pain, palpitations, shortness of breath, trouble breathing, lightheadedness, dizziness or blurred vision. Did not have labs completed, but plans to do so today. Fasting. Daughter is pregnant with her 9th child- due in August. States her daughter Kia Chang not believe in birth control. \" States \"I am done with her. \" States he is concerned for her finances. Lives with his daughter since Sept 2021 to help her out. Review of Systems   Constitutional: Negative for chills, fatigue and fever. Respiratory: Negative for cough, shortness of breath and wheezing. Cardiovascular: Negative for chest pain, palpitations and leg swelling. Skin: Negative for pallor and rash. Neurological: Negative for dizziness, weakness, light-headedness, numbness and headaches.      No Known Allergies    Current Outpatient Rx   Medication Sig Dispense Refill    lisinopril (PRINIVIL;ZESTRIL) 30 MG tablet TAKE 1 TABLET BY MOUTH EVERY DAY 30 tablet 0     Patient Active Problem List   Diagnosis    Hypertension    Prediabetes    Primary osteoarthritis of left hip    Marijuana abuse      Wt Readings from Last 3 Encounters:   04/12/22 191 lb 9.6 oz (86.9 kg)   03/01/22 197 lb (89.4 kg)   08/31/21 190 lb 3.2 oz (86.3 kg)     BP Readings from Last 3 Encounters:   04/12/22 118/78   03/01/22 128/82   08/31/21 138/84     The ASCVD Risk score ([place] Paulino., et al., 2013) failed to calculate for the following reasons:    Cannot find a previous HDL lab    Cannot find a previous total cholesterol lab  ^getting this completed today    Objective/Physical Exam:  /78   Pulse 76   Ht 5' 6\" (1.676 m)   Wt 191 lb 9.6 oz (86.9 kg)   BMI 30.93 kg/m²   Body mass index is 30.93 kg/m². Physical Exam  Vitals reviewed. Constitutional:       General: He is not in acute distress. Appearance: He is well-developed. He is not diaphoretic. HENT:      Head: Normocephalic and atraumatic. Cardiovascular:      Rate and Rhythm: Normal rate and regular rhythm. Pulmonary:      Effort: Pulmonary effort is normal. No respiratory distress. Breath sounds: Normal breath sounds. No wheezing or rales. Chest:      Chest wall: No tenderness. Abdominal:      General: Bowel sounds are normal.   Skin:     General: Skin is warm and dry. Coloration: Skin is not pale. Neurological:      Mental Status: He is alert and oriented to person, place, and time. Coordination: Coordination normal.       Assessment and Plan:  Robinson Mcgowan was seen today for hypertension. Diagnoses and all orders for this visit:    Essential hypertension  -    BP well controlled today. Asymptomatic. Denies side effects  - Continue current regimen   - lisinopril (PRINIVIL;ZESTRIL) 30 MG tablet; TAKE 1 TABLET BY MOUTH EVERY DAY- refilled today  - Pt will call if symptoms worsen or fail to improve  - Red flag warning signs reviewed with the pt and he will go to the ER if these occur. Having labs completed today. Return in about 3 months (around 7/12/2022) for HTN/prediabetes/hip pains f/u, or sooner if needed. Pt will call if symptoms worsen or fail to improve. All questions answered. Pt states no further questions or concerns at this time.    Electronically signed by: CHERYL Wing CNP 04/12/22

## 2022-04-13 LAB
ESTIMATED AVERAGE GLUCOSE: 125.5 MG/DL
HBA1C MFR BLD: 6 %
HIV AG/AB: NORMAL
HIV ANTIGEN: NORMAL
HIV-1 ANTIBODY: NORMAL
HIV-2 AB: NORMAL

## 2022-04-26 ENCOUNTER — OFFICE VISIT (OUTPATIENT)
Dept: INTERNAL MEDICINE CLINIC | Age: 62
End: 2022-04-26
Payer: COMMERCIAL

## 2022-04-26 VITALS
BODY MASS INDEX: 31.22 KG/M2 | WEIGHT: 193.4 LBS | HEART RATE: 66 BPM | SYSTOLIC BLOOD PRESSURE: 138 MMHG | OXYGEN SATURATION: 99 % | DIASTOLIC BLOOD PRESSURE: 90 MMHG

## 2022-04-26 DIAGNOSIS — R73.03 PREDIABETES: ICD-10-CM

## 2022-04-26 DIAGNOSIS — J34.89 SINUS PRESSURE: ICD-10-CM

## 2022-04-26 DIAGNOSIS — I10 ESSENTIAL HYPERTENSION: ICD-10-CM

## 2022-04-26 DIAGNOSIS — E78.2 MIXED HYPERLIPIDEMIA: Primary | ICD-10-CM

## 2022-04-26 PROCEDURE — G8427 DOCREV CUR MEDS BY ELIG CLIN: HCPCS | Performed by: NURSE PRACTITIONER

## 2022-04-26 PROCEDURE — 1036F TOBACCO NON-USER: CPT | Performed by: NURSE PRACTITIONER

## 2022-04-26 PROCEDURE — 3017F COLORECTAL CA SCREEN DOC REV: CPT | Performed by: NURSE PRACTITIONER

## 2022-04-26 PROCEDURE — G8417 CALC BMI ABV UP PARAM F/U: HCPCS | Performed by: NURSE PRACTITIONER

## 2022-04-26 PROCEDURE — 99214 OFFICE O/P EST MOD 30 MIN: CPT | Performed by: NURSE PRACTITIONER

## 2022-04-26 RX ORDER — FLUTICASONE PROPIONATE 50 MCG
1 SPRAY, SUSPENSION (ML) NASAL DAILY
Qty: 16 G | Refills: 0 | Status: SHIPPED | OUTPATIENT
Start: 2022-04-26 | End: 2022-06-22

## 2022-04-26 RX ORDER — ATORVASTATIN CALCIUM 20 MG/1
20 TABLET, FILM COATED ORAL DAILY
Qty: 90 TABLET | Refills: 0 | Status: SHIPPED | OUTPATIENT
Start: 2022-04-26 | End: 2022-07-14 | Stop reason: SDUPTHER

## 2022-04-26 ASSESSMENT — ENCOUNTER SYMPTOMS
SHORTNESS OF BREATH: 0
RHINORRHEA: 0
WHEEZING: 0
EYE PAIN: 0
SORE THROAT: 0
SINUS PRESSURE: 1
COUGH: 0
TROUBLE SWALLOWING: 0

## 2022-04-26 NOTE — PATIENT INSTRUCTIONS
After starting the statin, 6 weeks later, get fasting labs:  Please get your fasting lab work (no food or drink for 10-12 hours prior besides water) completed M-F [id number] at our office. [facility] lab has walk-in hours available as well - no appointment is needed. We will call or mychart message you with your results. Patient Education        atorvastatin  Pronunciation: a TOR va sta tin  Brand: Lipitor  What is the most important information I should know about atorvastatin? You should not take atorvastatin if you are pregnant or breastfeeding, or ifyou have liver disease. Tell your doctor about all your current medicines and any you start or stop using. Many drugs can interact, and some drugs should not be used together. Atorvastatin can cause the breakdown of muscle tissue, which can lead to kidney failure. Call your doctor right away if you have unexplained muscle pain, tenderness, or weakness especially if you also have fever, unusual tiredness,or dark urine. What is atorvastatin? Atorvastatin is used together with diet to lower blood levels of \"bad\" cholesterol (low-density lipoprotein, or LDL), to increase levels of \"good\" cholesterol (high-density lipoprotein, or HDL), and to lower triglycerides (atype of fat in the blood). Atorvastatin is used to treat high cholesterol, and to lower the risk of stroke, heart attack, or other heart complications in people with type 2diabetes, coronary heart disease, or other risk factors. Atorvastatin is used in adults and children who are at least 8years old. Atorvastatin may also be used for purposes not listed in this medication guide. What should I discuss with my healthcare provider before taking atorvastatin? You should not use atorvastatin if you are allergic to it, or if you have liverdisease. Do not use if you are pregnant. This medicine can harm an unborn baby. Use effective birth control to prevent pregnancy.  Stop taking this medicine and tell your doctor at once if you becomepregnant. Do not breastfeed while you are taking atorvastatin. Tell your doctor if you have ever had:   liver problems;   muscle pain or weakness;   kidney disease;   diabetes;   a thyroid disorder; or   if you drink more than 2 alcoholic beverages daily. Atorvastatin can cause the breakdown of muscle tissue, which can lead to kidney failure. This happens more often in women, in older adults, or people who have kidneydisease or poorly controlled hypothyroidism (underactive thyroid). Atorvastatin is not approved for use by anyone younger than 8years old. How should I take atorvastatin? Follow all directions on your prescription label and read all medication guides or instruction sheets. Your doctor may occasionally change your dose. Use themedicine exactly as directed. Take the medicine at the same time each day, with or without food. Do not break an atorvastatin tablet before taking it, unless your doctor has told you to. You may need to stop using atorvastatin for a short time if you have:   uncontrolled seizures;   an electrolyte imbalance (such as high or low potassium levels in your blood);   severely low blood pressure;   a severe infection or illness; or   surgery or a medical emergency. It may take up to 2 weeks before your cholesterol levels improve, and you may need frequent blood tests. Even if you have no symptoms, tests can help yourdoctor determine if this medicine is effective. Atorvastatin is only part of a complete treatment program that may also include diet, exercise, and weight control. Follow your doctor's instructions veryclosely. Store at room temperature away from moisture, heat, and light. What happens if I miss a dose? Use the medicine as soon as you can, but skip the missed dose if you are more than 12 hours late for the dose. Do not use two doses at one time. What happens if I overdose?   Seek emergency medical attention or call the Poison Help line at 1-758.668.8366. What should I avoid while taking atorvastatin? Avoid eating foods high in fat or cholesterol, or atorvastatin will not be aseffective. Avoid drinking alcohol. It can raise triglyceride levels and may increase yourrisk of liver damage. Grapefruit may interact with atorvastatin and lead to unwanted side effects. Avoid drinking more than 1 liter of grapefruit juice while taking atorvastatin. What are the possible side effects of atorvastatin? Get emergency medical help if you have signs of an allergic reaction: hives; difficulty breathing; swelling of your face, lips, tongue, or throat. In rare cases, atorvastatin can cause a condition that results in the breakdown of skeletal muscle tissue, leading to kidney failure. Call your doctor right away if you have unexplained muscle pain, tenderness, or weakness especially ifyou also have fever, unusual tiredness, and dark colored urine. Also call your doctor at once if you have:   muscle weakness in your hips, shoulders, neck, and back;   trouble lifting your arms, trouble climbing or standing;   liver problems --upper stomach pain, weakness, tired feeling, loss of appetite, dark urine, jaundice (yellowing of the skin or eyes); or   kidney problems --little or no urinating, swelling in your feet or ankles, feeling tired or short of breath. Common side effects may include:   joint pain;   stuffy nose, sore throat;   diarrhea; or   pain in your arms or legs. This is not a complete list of side effects and others may occur. Call your doctor for medical advice about side effects. You may report side effects toFDA at 8-685-OUL-1384. What other drugs will affect atorvastatin? Certain other drugs can increase your risk of serious muscle problems, and it is very important that your doctor knows if you are using any of them.  Tell your doctor about all your current medicines and any you start or stopusing, especially:   other cholesterol-lowering medication;   antibiotic or antifungal medicine;   birth control pills;   medicine to prevent organ transplant rejection;   heart medication; or   medicine to treat hepatitis C or HIV. This list is not complete and many other drugs may affect atorvastatin. This includes prescription and over-the-counter medicines, vitamins, andherbal products. Not all possible drug interactions are listed here. Where can I get more information? Your pharmacist can provide more information about atorvastatin. Remember, keep this and all other medicines out of the reach of children, never share your medicines with others, and use this medication only for the indication prescribed. Every effort has been made to ensure that the information provided by [facility]  is accurate, up-to-date, and complete, but no guarantee is made to that effect. Drug information contained herein may be time sensitive. [facility] information has been compiled for use by healthcare practitioners and consumers in the [place] and therefore [employer] does not warrant that uses outside of the [place] are appropriate, unless specifically indicated otherwise. [facility]'s drug information does not endorse drugs, diagnose patients or recommend therapy. [facility] drug information is an informational resource designed to assist licensed healthcare practitioners in caring for their patients and/or to serve consumers viewing this service as a supplement to, and not a substitute for, the expertise, skill, knowledge and judgment of healthcare practitioners. The absence of a warning for a given drug or drug combination in no way should be construed to indicate that the drug or drug combination is safe, effective or appropriate for any given patient. [facility] does not assume any responsibility for any aspect of healthcare administered with the aid of information [facility] provides.  The information contained herein is not intended to cover all possible uses, directions, precautions, warnings, drug interactions, allergic reactions, or adverse effects. If you have questions about the drugs you are taking, check with yourdoctor, nurse or pharmacist.  Copyright 2304-7780 [address]: 22.02. Revision date: 2/9/2021. Care instructions adapted under license by [facility] ([facility]). If you have questions about a medical condition or this instruction, always ask your healthcare professional. [place] [address] any warranty or liability for your use of this information. Patient Education        fluticasone nasal  Pronunciation: floo TIK a sone  Brand: Flonase, Veramyst, Rosio Modesta  What is the most important information I should know about fluticasone nasal?  Follow all directions on your medicine label and package. Tell each of your healthcare providers about all your medical conditions, allergies, and allmedicines you use. What is fluticasone nasal?  Fluticasone nasal (for the nose) is a steroid medicine that is used to treat nasal congestion, sneezing, runny nose, and itchy or watery eyes caused byseasonal or year-round allergies. The Rosio Modesta brand of this medicine is for use only in adults. Veramyst may be used in children as young as 3years old. Flonase is for use in adults and children who are at least 3years old. Fluticasone nasal may also be used for purposes not listed in this medicationguide. What should I discuss with my healthcare provider before using fluticasone nasal?  You should not use fluticasone nasal if you are allergic to it. Fluticasone can weaken your immune system,  making it easier for you to get an infection or worsening an infection you already have or recently had. Tell your doctor about any illness or infectionyou have had within the past several weeks.   Tell your doctor if you have ever had:   sores or ulcers inside your nose;   injury of or surgery on your nose;   glaucoma or cataracts;   liver disease;   diabetes;   a weak immune system; or   any type of infection (bacterial, fungal, viral, or parasitic). If you use fluticasone nasal without a prescription and you have any medicalconditions, ask a doctor or pharmacist if this medicine is safe for you. Tell your doctor if you are pregnant or breast-feeding. How should I use fluticasone nasal?  Follow all directions on your prescription label and read all medication guidesor instruction sheets. Use the medicine exactly as directed. Do not share this medicine with another person, even if they have the same symptoms you have. Your dose will depend on the fluticasone brand or strength you use, and your dose may change once your symptoms improve. Follow all dosing instructions very carefully. A child using the nasal spray should be supervised by an adult. Read and carefully follow any Instructions for Use provided with your medicine. Ask your doctor or pharmacist if you do not understand these instructions. Shake the nasal spray just before each use. If you switched to fluticasone from another steroid medicine, you should not stop using it suddenly. Follow your doctor's instructions about tapering your dose. It may take several days before your symptoms improve. Keep using the medication as directed and tell your doctor if your symptoms do not improveafter a week of treatment. Store fluticasone nasal in an upright position at room temperature, away from moisture and heat. Throw the spray bottle away after you have used 120 sprays, even if there is still medicine left in the bottle. What happens if I miss a dose? Use the medicine as soon as you can, but skip the missed dose if it is almost time for your next dose. Do not use two doses at one time. What happens if I overdose? Seek emergency medical attention or call the Poison Help line at 1-856.201.8117.   An overdose of fluticasone nasal is not expected to produce life threatening symptoms. Long term use of steroid medicine can lead to glaucoma, cataracts, thinning skin, easy bruising, changes in body fat (especially in your face, neck, back, and waist), increased acne or facialhair, menstrual problems, impotence, or loss of interest in sex. What should I avoid while using fluticasone nasal?  Avoid getting the spray in your eyes or mouth. If this does happen, rinse withwater. Avoid being near people who are sick or have infections. Call your doctor for preventive treatment if you are exposed to chickenpox or measles. These conditions can be serious or even fatal in people who are using fluticasonenasal.  What are the possible side effects of fluticasone nasal?  Get emergency medical help if you have signs of an allergic reaction: hives, rash; feeling light-headed; difficult breathing; swelling of yourface, lips, tongue, or throat. Call your doctor at once if you have:   severe or ongoing nosebleeds;   noisy breathing, runny nose, or crusting around your nostrils;   redness, sores, or white patches in your mouth or throat;   fever, chills, body aches;   blurred vision, eye pain, or seeing halos around lights;   any wound that will not heal; or   signs of a hormonal disorder --worsening tiredness or muscle weakness, feeling light-headed, nausea, vomiting. Steroid medicine can affect growth in children. Tell your doctor if your childis not growing at a normal rate while using this medicine. Common side effects may include:   minor nosebleed, burning or itching in your nose;   sores or white patches inside or around your nose;   cough, trouble breathing;   headache, back pain;   sinus pain, sore throat, fever; or   nausea, vomiting. This is not a complete list of side effects and others may occur. Call your doctor for medical advice about side effects. You may report side effects toFDA at 4-223-FDA-0689.   What other drugs will affect fluticasone nasal?  Tell your doctor about all your other medicines, especially:   antifungal medicine; or   antiviral medicine to treat hepatis C or HIV/AIDS. This list is not complete. Other drugs may affect fluticasone nasal, including prescription and over-the-counter medicines, vitamins, and herbal products. Notall possible drug interactions are listed here. Where can I get more information? Your pharmacist can provide more information about fluticasone nasal.  Remember, keep this and all other medicines out of the reach of children, never share your medicines with others, and use this medication only for the indication prescribed. Every effort has been made to ensure that the information provided by [address] [place], [place] [address] is accurate, up-to-date, and complete, but no guarantee is made to that effect. Drug information contained herein may be time sensitive. [facility] information has been compiled for use by healthcare practitioners and consumers in the [place] and therefore [facility] does not warrant that uses outside of the [place] are appropriate, unless specifically indicated otherwise. [facility]'s drug information does not endorse drugs, diagnose patients or recommend therapy. [facility]'s drug information is an informational resource designed to assist licensed healthcare practitioners in caring for their patients and/or to serve consumers viewing this service as a supplement to, and not a substitute for, the expertise, skill, knowledge and judgment of healthcare practitioners. The absence of a warning for a given drug or drug combination in no way should be construed to indicate that the drug or drug combination is safe, effective or appropriate for any given patient. [facility] does not assume any responsibility for any aspect of healthcare administered with the aid of information [facility] provides.  The information contained herein is not intended to cover all possible uses, directions, precautions, warnings, drug interactions, allergic reactions, or adverse effects. If you have questions about the drugs you are taking, check with yourdoctor, nurse or pharmacist.  Copyright 3707-0568 [address] Version: 10.02. Revision date:12/30/2019. Care instructions adapted under license by [facility] ([facility]). If you have questions about a medical condition or this instruction, always ask your healthcare professional. [place] [address] any warranty or liability for your use of this information.

## 2022-04-26 NOTE — PROGRESS NOTES
Office Visit   4/26/2022    Subjective:  Chief Complaint   Patient presents with    Discuss Labs     review    Sinus Problem     no cough,  states grandkids had similar symptoms. HPI:  Slama De Los Santos is a 64 y.o. male who presents to the clinic today for follow up. Patient is here for a lab follow-up.      hyperlipidemia-patient's cholesterol is elevated and ASCVD risk score elevated as well. He reports he has never been on a statin medication. Prediabetes - States he eats a lot of candy late at night. Not exercising, but plans to do so. Pt also reports an acute concern:  Reports sinus pressure. Denies nasal drainage. Denies cough. Denies sore throat. Denies fevers/chills. Denies itching/watery eyes. Denies ear pain. Grandchild had a cold recently. States he is feeling well overall. Denies chest pain, palpitations, shortness of breath, trouble breathing, lightheadedness, dizziness or blurred vision. Review of Systems   Constitutional: Negative for appetite change, chills, diaphoresis, fatigue and fever. HENT: Positive for sinus pressure. Negative for congestion, drooling, ear discharge, ear pain, hearing loss, postnasal drip, rhinorrhea, sneezing, sore throat and trouble swallowing. Eyes: Negative for pain and visual disturbance. Respiratory: Negative for cough, shortness of breath and wheezing. Cardiovascular: Negative for chest pain and palpitations. Skin: Negative for pallor. Neurological: Negative for dizziness, light-headedness and headaches.    n  Charity Known Allergies    Current Outpatient Rx   Medication Sig Dispense Refill    atorvastatin (LIPITOR) 20 MG tablet Take 1 tablet by mouth daily 90 tablet 0    fluticasone (FLONASE) 50 MCG/ACT nasal spray 1 spray by Each Nostril route daily 16 g 0    lisinopril (PRINIVIL;ZESTRIL) 30 MG tablet TAKE 1 TABLET BY MOUTH EVERY DAY 30 tablet 0     Patient Active Problem List   Diagnosis    Hypertension    Prediabetes    Primary osteoarthritis of left hip    Marijuana abuse      Wt Readings from Last 3 Encounters:   04/26/22 193 lb 6.4 oz (87.7 kg)   04/12/22 191 lb 9.6 oz (86.9 kg)   03/01/22 197 lb (89.4 kg)     BP Readings from Last 3 Encounters:   04/26/22 (!) 138/90   04/12/22 118/78   03/01/22 128/82     The 10-year ASCVD risk score (Peter Rosario, et al., 2013) is: 19%    Values used to calculate the score:      Age: 64 years      Sex: Male      Is Non- : Yes      Diabetic: No      Tobacco smoker: No      Systolic Blood Pressure: 890 mmHg      Is BP treated: Yes      HDL Cholesterol: 28 mg/dL      Total Cholesterol: 213 mg/dL    Vitals 4/26/2022 9/96/7148   SYSTOLIC 407 639   DIASTOLIC 90 90     Objective/Physical Exam:  BP (!) 138/90 (Site: Left Upper Arm, Position: Sitting)   Pulse 66   Wt 193 lb 6.4 oz (87.7 kg)   SpO2 99%   BMI 31.22 kg/m²   Body mass index is 31.22 kg/m². Physical Exam  Vitals reviewed. Constitutional:       General: He is not in acute distress. Appearance: He is well-developed. He is not diaphoretic. HENT:      Head: Normocephalic and atraumatic. Right Ear: Hearing, tympanic membrane and external ear normal.      Left Ear: Hearing normal.      Ears:      Comments: Left ear effusion noted without infection  Eyes:      Conjunctiva/sclera: Conjunctivae normal.      Pupils: Pupils are equal, round, and reactive to light. Cardiovascular:      Rate and Rhythm: Normal rate and regular rhythm. Pulmonary:      Effort: Pulmonary effort is normal. No respiratory distress. Breath sounds: Normal breath sounds. No stridor. No wheezing. Lymphadenopathy:      Cervical: No cervical adenopathy. Skin:     General: Skin is warm and dry. Neurological:      Mental Status: He is alert and oriented to person, place, and time. Assessment and Plan:  Teresa Dennis was seen today for discuss labs and head congestion.   Diagnoses and all orders for this visit:    Mixed hyperlipidemia  - ASCVD risk score reviewed with the patient.  - Statin medications reviewed with the patient. Risks versus benefits were reviewed. Side effects were reviewed. - options reviewed. Pt agreeable to lipitor.  - atorvastatin (LIPITOR) 20 MG tablet; Take 1 tablet by mouth daily - patient education handout provided and reviewed with the pt. - Repeat labs in 6 weeks. -     Lipid, Fasting; Future  -     Comprehensive Metabolic Panel; Future    Prediabetes   - Lifestyle modifications such as exercise, weight loss and healthy diet encouraged and reviewed with the pt. Sinus pressure  -    See physical exam. Recommend flonase nasal spray daily-pt agreeable. - fluticasone (FLONASE) 50 MCG/ACT nasal spray; 1 spray by Each Nostril route daily- - patient education handout provided and reviewed with the pt. - Pt will call if symptoms worsen or fail to improve  - Red flag warning signs reviewed with the pt and he will go to the ER if these occur. Essential hypertension   - BP elevated today. Pt reports that he has not taken his medications yet today. States he was running late. - Recommend medication compliance. - Recommend pt monitor BP at home and call with elevated readings. Return for as previously scheduled or sooner if needed. Pt will call if symptoms worsen or fail to improve. All questions answered. Pt states no further questions or concerns at this time.    Electronically signed by: CHERYL Cheatham CNP 04/26/22

## 2022-06-22 DIAGNOSIS — J34.89 SINUS PRESSURE: ICD-10-CM

## 2022-06-22 RX ORDER — FLUTICASONE PROPIONATE 50 MCG
SPRAY, SUSPENSION (ML) NASAL
Qty: 1 EACH | Refills: 0 | Status: SHIPPED | OUTPATIENT
Start: 2022-06-22 | End: 2022-08-31

## 2022-06-22 NOTE — TELEPHONE ENCOUNTER
Recent Visits  Date Type Provider Dept   04/26/22 Office Visit CHERYL Francisco CNP [facility]   04/12/22 Office Visit CHERYL Francisco CNP [facility]   03/01/22 Office Visit CHERYL Francisco CNP [facility]   08/31/21 Office Visit CHERYL Francisco CNP [facility]   05/11/21 Office Visit CHERYL Francisco CNP [facility] recent visits within past 540 days with a meds authorizing provider and meeting all other requirements  Future Appointments  Date Type Provider Dept   07/12/22 Appointment CHERYL Francisco CNP [facility]   Showing future appointments within next 150 days with a meds authorizing provider and meeting all other requirements     4/26/2022

## 2022-07-14 ENCOUNTER — OFFICE VISIT (OUTPATIENT)
Dept: INTERNAL MEDICINE CLINIC | Age: 62
End: 2022-07-14
Payer: COMMERCIAL

## 2022-07-14 VITALS
SYSTOLIC BLOOD PRESSURE: 132 MMHG | DIASTOLIC BLOOD PRESSURE: 80 MMHG | BODY MASS INDEX: 30.95 KG/M2 | WEIGHT: 192.6 LBS | HEART RATE: 84 BPM | OXYGEN SATURATION: 97 % | HEIGHT: 66 IN

## 2022-07-14 DIAGNOSIS — F12.90 MARIJUANA USE: ICD-10-CM

## 2022-07-14 DIAGNOSIS — R73.03 PREDIABETES: ICD-10-CM

## 2022-07-14 DIAGNOSIS — E78.2 MIXED HYPERLIPIDEMIA: Primary | ICD-10-CM

## 2022-07-14 DIAGNOSIS — I10 ESSENTIAL HYPERTENSION: ICD-10-CM

## 2022-07-14 DIAGNOSIS — Z12.5 SCREENING PSA (PROSTATE SPECIFIC ANTIGEN): ICD-10-CM

## 2022-07-14 PROCEDURE — G8417 CALC BMI ABV UP PARAM F/U: HCPCS | Performed by: NURSE PRACTITIONER

## 2022-07-14 PROCEDURE — 3017F COLORECTAL CA SCREEN DOC REV: CPT | Performed by: NURSE PRACTITIONER

## 2022-07-14 PROCEDURE — G8427 DOCREV CUR MEDS BY ELIG CLIN: HCPCS | Performed by: NURSE PRACTITIONER

## 2022-07-14 PROCEDURE — 1036F TOBACCO NON-USER: CPT | Performed by: NURSE PRACTITIONER

## 2022-07-14 PROCEDURE — 99214 OFFICE O/P EST MOD 30 MIN: CPT | Performed by: NURSE PRACTITIONER

## 2022-07-14 RX ORDER — ATORVASTATIN CALCIUM 20 MG/1
20 TABLET, FILM COATED ORAL DAILY
Qty: 90 TABLET | Refills: 1 | Status: SHIPPED | OUTPATIENT
Start: 2022-07-14

## 2022-07-14 RX ORDER — LISINOPRIL 30 MG/1
TABLET ORAL
Qty: 90 TABLET | Refills: 1 | Status: SHIPPED | OUTPATIENT
Start: 2022-07-14

## 2022-07-14 ASSESSMENT — ENCOUNTER SYMPTOMS
SHORTNESS OF BREATH: 0
VOMITING: 0
ABDOMINAL PAIN: 0
COUGH: 0
WHEEZING: 0
CONSTIPATION: 0
NAUSEA: 0
DIARRHEA: 0

## 2022-07-14 ASSESSMENT — PATIENT HEALTH QUESTIONNAIRE - PHQ9
SUM OF ALL RESPONSES TO PHQ QUESTIONS 1-9: 0
SUM OF ALL RESPONSES TO PHQ9 QUESTIONS 1 & 2: 0
SUM OF ALL RESPONSES TO PHQ QUESTIONS 1-9: 0
1. LITTLE INTEREST OR PLEASURE IN DOING THINGS: 0
2. FEELING DOWN, DEPRESSED OR HOPELESS: 0
SUM OF ALL RESPONSES TO PHQ QUESTIONS 1-9: 0
SUM OF ALL RESPONSES TO PHQ QUESTIONS 1-9: 0

## 2022-07-14 NOTE — PROGRESS NOTES
Office Visit  7/14/2022    Subjective:  Chief Complaint   Patient presents with    3 Month Follow-Up    Hypertension    Other     Pre Diabetes    Other     Not taking Lipitor regularly     HPI:  Magaly Smith is a 64 y.o. male who presents to the clinic today for follow up.    hyperlipidemia- prescribed Lipitor 20 mg by mouth daily. States he takes it intermittently, not consistently per pt. States he takes this medication every other day on average. Denies side effects- states he just forgets. Repeat labs were ordered and not completed as recommended. Prediabetes - exercising \"every now and then. \" Diet is \"not bad. I can do better. I eat too many sweets. \" Enjoys candy and cookies. HTN-Prescribed lisinopril 30 mg daily - states he is taking this medication without side effects.   Not monitoring BP at home.   Asymptomatic. Denies chest pain, palpitations, shortness of breath, trouble breathing, lightheadedness, dizziness or blurred vision. Marijuana abuse- decreasing use. Smokes 3 grams per week on average. Not ready to quit at this time. Review of Systems   Constitutional: Negative for chills, fatigue, fever and unexpected weight change. Eyes: Negative for visual disturbance. Respiratory: Negative for cough, shortness of breath and wheezing. Cardiovascular: Negative for chest pain, palpitations and leg swelling. Gastrointestinal: Negative for abdominal pain, constipation, diarrhea, nausea and vomiting. Skin: Negative for pallor and rash. Neurological: Negative for dizziness, weakness, light-headedness, numbness and headaches. Psychiatric/Behavioral: Negative for dysphoric mood, self-injury, sleep disturbance and suicidal ideas. The patient is not nervous/anxious.       No Known Allergies    Current Outpatient Rx   Medication Sig Dispense Refill    lisinopril (PRINIVIL;ZESTRIL) 30 MG tablet TAKE 1 TABLET BY MOUTH EVERY DAY 90 tablet 1    atorvastatin (LIPITOR) 20 MG tablet Take 1 Coordination normal.   Psychiatric:         Mood and Affect: Mood normal.       Assessment and Plan:  Adilia Cadet was seen today for 3 month follow-up, hypertension, other and other. Diagnoses and all orders for this visit:    Mixed hyperlipidemia  -     Not taking medication as prescribed. Denies side effects. Compliance encouraged. Risks versus benefits were reviewed. - did not repeat labs as recommended. - Patient agreeable to restarting medication daily as prescribed and repeating labs   - atorvastatin (LIPITOR) 20 MG tablet; Take 1 tablet by mouth daily-refilled today    Prediabetes  -     Lifestyle modifications such as exercise, weight loss and healthy diet encouraged and reviewed with the pt. Essential hypertension  -    BP stable. Asymptomatic. Denies side effects.  - Continue current regimen. - lisinopril (PRINIVIL;ZESTRIL) 30 MG tablet; TAKE 1 TABLET BY MOUTH EVERY DAY    Marijuana use   - Cessation recommended    Screening PSA (prostate specific antigen)  -    Asymptomatic. Will evaluate  - PSA, Prostatic Specific Antigen; Future    Return in about 5 months (around 12/14/2022) for annual physical exam and HLD/HTN/preDM f/u, or sooner if needed. Pt will call if symptoms worsen or fail to improve. All questions answered. Pt states no further questions or concerns at this time.    Electronically signed by: CHERYL Pena - MP 07/14/22

## 2022-08-31 DIAGNOSIS — J34.89 SINUS PRESSURE: ICD-10-CM

## 2022-08-31 RX ORDER — FLUTICASONE PROPIONATE 50 MCG
SPRAY, SUSPENSION (ML) NASAL
Qty: 1 EACH | Refills: 0 | Status: SHIPPED | OUTPATIENT
Start: 2022-08-31

## 2023-01-18 ENCOUNTER — OFFICE VISIT (OUTPATIENT)
Dept: INTERNAL MEDICINE CLINIC | Age: 63
End: 2023-01-18
Payer: COMMERCIAL

## 2023-01-18 VITALS
HEIGHT: 66 IN | DIASTOLIC BLOOD PRESSURE: 74 MMHG | OXYGEN SATURATION: 96 % | HEART RATE: 87 BPM | BODY MASS INDEX: 30.79 KG/M2 | WEIGHT: 191.6 LBS | SYSTOLIC BLOOD PRESSURE: 122 MMHG

## 2023-01-18 DIAGNOSIS — E78.2 MIXED HYPERLIPIDEMIA: ICD-10-CM

## 2023-01-18 DIAGNOSIS — Z71.85 VACCINE COUNSELING: ICD-10-CM

## 2023-01-18 DIAGNOSIS — I10 ESSENTIAL HYPERTENSION: ICD-10-CM

## 2023-01-18 DIAGNOSIS — R73.03 PREDIABETES: ICD-10-CM

## 2023-01-18 DIAGNOSIS — Z12.5 SCREENING PSA (PROSTATE SPECIFIC ANTIGEN): ICD-10-CM

## 2023-01-18 DIAGNOSIS — F12.90 MARIJUANA USE: ICD-10-CM

## 2023-01-18 DIAGNOSIS — Z23 NEED FOR VACCINATION: ICD-10-CM

## 2023-01-18 DIAGNOSIS — J34.89 SINUS PRESSURE: ICD-10-CM

## 2023-01-18 DIAGNOSIS — Z00.00 ANNUAL PHYSICAL EXAM: Primary | ICD-10-CM

## 2023-01-18 PROCEDURE — 90472 IMMUNIZATION ADMIN EACH ADD: CPT | Performed by: NURSE PRACTITIONER

## 2023-01-18 PROCEDURE — 90674 CCIIV4 VAC NO PRSV 0.5 ML IM: CPT | Performed by: NURSE PRACTITIONER

## 2023-01-18 PROCEDURE — G8482 FLU IMMUNIZE ORDER/ADMIN: HCPCS | Performed by: NURSE PRACTITIONER

## 2023-01-18 PROCEDURE — 99396 PREV VISIT EST AGE 40-64: CPT | Performed by: NURSE PRACTITIONER

## 2023-01-18 PROCEDURE — 3078F DIAST BP <80 MM HG: CPT | Performed by: NURSE PRACTITIONER

## 2023-01-18 PROCEDURE — 90750 HZV VACC RECOMBINANT IM: CPT | Performed by: NURSE PRACTITIONER

## 2023-01-18 PROCEDURE — 3074F SYST BP LT 130 MM HG: CPT | Performed by: NURSE PRACTITIONER

## 2023-01-18 PROCEDURE — 90471 IMMUNIZATION ADMIN: CPT | Performed by: NURSE PRACTITIONER

## 2023-01-18 RX ORDER — ATORVASTATIN CALCIUM 20 MG/1
20 TABLET, FILM COATED ORAL DAILY
Qty: 90 TABLET | Refills: 1 | Status: SHIPPED | OUTPATIENT
Start: 2023-01-18

## 2023-01-18 RX ORDER — FLUTICASONE PROPIONATE 50 MCG
SPRAY, SUSPENSION (ML) NASAL
Qty: 1 EACH | Refills: 0 | Status: SHIPPED | OUTPATIENT
Start: 2023-01-18

## 2023-01-18 RX ORDER — LISINOPRIL 30 MG/1
TABLET ORAL
Qty: 90 TABLET | Refills: 1 | Status: SHIPPED | OUTPATIENT
Start: 2023-01-18

## 2023-01-18 SDOH — ECONOMIC STABILITY: FOOD INSECURITY: WITHIN THE PAST 12 MONTHS, THE FOOD YOU BOUGHT JUST DIDN'T LAST AND YOU DIDN'T HAVE MONEY TO GET MORE.: NEVER TRUE

## 2023-01-18 SDOH — ECONOMIC STABILITY: FOOD INSECURITY: WITHIN THE PAST 12 MONTHS, YOU WORRIED THAT YOUR FOOD WOULD RUN OUT BEFORE YOU GOT MONEY TO BUY MORE.: NEVER TRUE

## 2023-01-18 ASSESSMENT — PATIENT HEALTH QUESTIONNAIRE - PHQ9
4. FEELING TIRED OR HAVING LITTLE ENERGY: 0
SUM OF ALL RESPONSES TO PHQ QUESTIONS 1-9: 0
5. POOR APPETITE OR OVEREATING: 0
SUM OF ALL RESPONSES TO PHQ QUESTIONS 1-9: 0
SUM OF ALL RESPONSES TO PHQ9 QUESTIONS 1 & 2: 0
SUM OF ALL RESPONSES TO PHQ QUESTIONS 1-9: 0
9. THOUGHTS THAT YOU WOULD BE BETTER OFF DEAD, OR OF HURTING YOURSELF: 0
6. FEELING BAD ABOUT YOURSELF - OR THAT YOU ARE A FAILURE OR HAVE LET YOURSELF OR YOUR FAMILY DOWN: 0
7. TROUBLE CONCENTRATING ON THINGS, SUCH AS READING THE NEWSPAPER OR WATCHING TELEVISION: 0
1. LITTLE INTEREST OR PLEASURE IN DOING THINGS: 0
10. IF YOU CHECKED OFF ANY PROBLEMS, HOW DIFFICULT HAVE THESE PROBLEMS MADE IT FOR YOU TO DO YOUR WORK, TAKE CARE OF THINGS AT HOME, OR GET ALONG WITH OTHER PEOPLE: 0
2. FEELING DOWN, DEPRESSED OR HOPELESS: 0
SUM OF ALL RESPONSES TO PHQ QUESTIONS 1-9: 0
8. MOVING OR SPEAKING SO SLOWLY THAT OTHER PEOPLE COULD HAVE NOTICED. OR THE OPPOSITE, BEING SO FIGETY OR RESTLESS THAT YOU HAVE BEEN MOVING AROUND A LOT MORE THAN USUAL: 0
3. TROUBLE FALLING OR STAYING ASLEEP: 0

## 2023-01-18 ASSESSMENT — ENCOUNTER SYMPTOMS
VOMITING: 0
SINUS PAIN: 0
WHEEZING: 0
DIARRHEA: 0
SORE THROAT: 0
SHORTNESS OF BREATH: 0
CONSTIPATION: 0
COUGH: 0
ABDOMINAL PAIN: 0
NAUSEA: 0

## 2023-01-18 ASSESSMENT — SOCIAL DETERMINANTS OF HEALTH (SDOH): HOW HARD IS IT FOR YOU TO PAY FOR THE VERY BASICS LIKE FOOD, HOUSING, MEDICAL CARE, AND HEATING?: NOT HARD AT ALL

## 2023-01-18 NOTE — PATIENT INSTRUCTIONS
Please get your fasting lab work (no food or drink for 10-12 hours prior besides water) completed M-F 780a-430p at our office. [facility] lab has walk-in hours available as well - no appointment is needed. We will call or [place] message you with your results.        [address] - Yenni Fuentes MD   [address] [place] [address]   [phone]

## 2023-01-18 NOTE — PROGRESS NOTES
Annual Physical Office Visit   1/18/2023    Subjective:  Chief Complaint   Patient presents with    Annual Exam     HPI:  Ruth Tomlin is a 58 y.o. male who presents to the clinic today for annual physical.    Hyperlipidemia - prescribed Lipitor 20 mg by mouth daily. Denies side effects. Prediabetes - not exercising. Diet is \"bad. \" States his diet not healthy. HTN-Prescribed lisinopril 30 mg daily - states he is taking this medication without side effects. Not monitoring BP at home. Asymptomatic. Denies chest pain, palpitations, shortness of breath, trouble breathing, lightheadedness, dizziness or blurred vision. Marijuana abuse- decreasing use. Not ready to quit at this time. Sinus pressure - chronic. Uses flonase with relief. Would like a refill. Not working. He has family in town. 2 children. For fun, he plays with his grandchildren- 15 of them. His daughter has 9 children. Review of Systems   Constitutional:  Negative for chills, fatigue and fever. HENT:  Negative for congestion, postnasal drip, sinus pain and sore throat. Respiratory:  Negative for cough, shortness of breath and wheezing. Cardiovascular:  Negative for chest pain, palpitations and leg swelling. Gastrointestinal:  Negative for abdominal pain, constipation, diarrhea, nausea and vomiting. Genitourinary:  Negative for dysuria, frequency, hematuria and urgency. Skin:  Negative for pallor and rash. Neurological:  Negative for dizziness, weakness, light-headedness, numbness and headaches. Psychiatric/Behavioral:  Negative for dysphoric mood, sleep disturbance and suicidal ideas. The patient is not nervous/anxious.       No Known Allergies    Family History   Problem Relation Age of Onset    High Blood Pressure Mother     High Blood Pressure Father     High Blood Pressure Sister     High Blood Pressure Sister     Heart Attack Neg Hx     Stroke Neg Hx     Prostate Cancer Neg Hx      Current Outpatient Rx   Medication Sig Dispense Refill    lisinopril (PRINIVIL;ZESTRIL) 30 MG tablet TAKE 1 TABLET BY MOUTH EVERY DAY 90 tablet 1    atorvastatin (LIPITOR) 20 MG tablet Take 1 tablet by mouth daily 90 tablet 1    fluticasone (FLONASE) 50 MCG/ACT nasal spray SPRAY 1 SPRAY INTO EACH NOSTRIL EVERY DAY 1 each 0     Social History     Socioeconomic History    Marital status: Single     Spouse name: Not on file    Number of children: Not on file    Years of education: Not on file    Highest education level: Not on file   Occupational History    Not on file   Tobacco Use    Smoking status: Never    Smokeless tobacco: Never   Vaping Use    Vaping Use: Never used   Substance and Sexual Activity    Alcohol use: Not Currently     Comment: a few drinks every few years    Drug use: Yes     Types: Marijuana (Weed)     Comment: 0-2x/day - 0.5-1 gram per day on average    Sexual activity: Not on file     Comment: single    Other Topics Concern    Not on file   Social History Narrative    Not working. He has family in town. 2 children. For fun, he plays with his grandchildren- 14 of them. His daughter has 9 children.     Social Determinants of Health     Financial Resource Strain: Low Risk     Difficulty of Paying Living Expenses: Not hard at all   Food Insecurity: No Food Insecurity    Worried About Running Out of Food in the Last Year: Never true    Ran Out of Food in the Last Year: Never true   Transportation Needs: Not on file   Physical Activity: Not on file   Stress: Not on file   Social Connections: Not on file   Intimate Partner Violence: Not on file   Housing Stability: Not on file     Past Medical History:   Diagnosis Date    HLD (hyperlipidemia)     Hypertension     Prediabetes      Patient Active Problem List   Diagnosis    Hypertension    Prediabetes    Primary osteoarthritis of left hip    Marijuana abuse    Mixed hyperlipidemia      Wt Readings from Last 3 Encounters:   01/18/23 191 lb 9.6 oz (86.9 kg)   07/14/22 192 lb 9.6  oz (87.4 kg)   04/26/22 193 lb 6.4 oz (87.7 kg)     BP Readings from Last 3 Encounters:   01/18/23 122/74   07/14/22 132/80   04/26/22 (!) 138/90     The 10-year ASCVD risk score (Abner PEDROZA, et al., 2019) is: 15.9%    Values used to calculate the score:      Age: 58 years      Sex: Male      Is Non- : Yes      Diabetic: No      Tobacco smoker: No      Systolic Blood Pressure: 520 mmHg      Is BP treated: Yes      HDL Cholesterol: 28 mg/dL      Total Cholesterol: 213 mg/dL    PHQ-9 Total Score: 0 (1/18/2023 10:00 AM)  Thoughts that you would be better off dead, or of hurting yourself in some way: 0 (1/18/2023 10:00 AM)    Objective/Physical Exam:  /74   Pulse 87   Ht 5' 6\" (1.676 m)   Wt 191 lb 9.6 oz (86.9 kg)   SpO2 96%   BMI 30.93 kg/m²   Body mass index is 30.93 kg/m². Physical Exam  Vitals reviewed. Constitutional:       General: He is not in acute distress. Appearance: He is well-developed. He is not diaphoretic. HENT:      Head: Normocephalic and atraumatic. Cardiovascular:      Rate and Rhythm: Normal rate and regular rhythm. Pulmonary:      Effort: Pulmonary effort is normal. No respiratory distress. Breath sounds: Normal breath sounds. No wheezing or rales. Chest:      Chest wall: No tenderness. Abdominal:      General: Bowel sounds are normal.      Palpations: Abdomen is soft. Skin:     General: Skin is warm and dry. Neurological:      Mental Status: He is alert and oriented to person, place, and time. Coordination: Coordination normal.   Psychiatric:         Mood and Affect: Mood normal.     Assessment and Plan:  Susy Lee was seen today for annual exam.    Diagnoses and all orders for this visit:    Annual physical exam   - Feels safe in his home and in his relationships.    - Wears his seatbelt in the car. - Last CBC 4/12/22 stable.     Mixed hyperlipidemia  -     Taking medication as prescribed without side effects   - Will re-evaluate. - Lipid, Fasting; Future  -     atorvastatin (LIPITOR) 20 MG tablet; Take 1 tablet by mouth daily    Prediabetes  -     Lifestyle modifications such as exercise, weight loss and healthy diet encouraged and reviewed with the pt. - Will re-evaluate. - Hemoglobin A1C; Future    Essential hypertension  -     Blood pressure to goal today. Asymptomatic. Denies side effects.  - Continue current regimen  - Comprehensive Metabolic Panel; Future  -     lisinopril (PRINIVIL;ZESTRIL) 30 MG tablet; TAKE 1 TABLET BY MOUTH EVERY DAY    Marijuana use   - Cessation recommended. Need for vaccination  -     Influenza, FLUCELVAX, (age 10 mo+), IM, PF, 0.5 mL - patient education handout provided and reviewed with the pt. Screening PSA (prostate specific antigen)  -     Asymptomatic. Patient requesting screening.  - PSA, Prostatic Specific Antigen; Future    Vaccine counseling   - Shingles vaccine - patient education handout provided and reviewed with the pt. - CDC guidelines regarding vaccinations reviewed with the patient. Sinus pressure  -     Well-controlled on the current regimen without side effects.  - fluticasone (FLONASE) 50 MCG/ACT nasal spray; SPRAY 1 SPRAY INTO EACH NOSTRIL EVERY DAY    Return in about 6 months (around 7/18/2023) for HTN/HLD/shingles vaccine , or sooner if needed. Pt will call if symptoms worsen or fail to improve. All questions answered. Pt states no further questions or concerns at this time.    Electronically signed by: CHERYL Taveras - MP 01/18/23

## 2023-03-29 DIAGNOSIS — J34.89 SINUS PRESSURE: ICD-10-CM

## 2023-03-29 RX ORDER — FLUTICASONE PROPIONATE 50 MCG
SPRAY, SUSPENSION (ML) NASAL
Qty: 1 EACH | Refills: 1 | Status: SHIPPED | OUTPATIENT
Start: 2023-03-29

## 2023-07-18 ENCOUNTER — OFFICE VISIT (OUTPATIENT)
Dept: INTERNAL MEDICINE CLINIC | Age: 63
End: 2023-07-18
Payer: COMMERCIAL

## 2023-07-18 VITALS
WEIGHT: 189.8 LBS | HEART RATE: 66 BPM | OXYGEN SATURATION: 98 % | HEIGHT: 66 IN | SYSTOLIC BLOOD PRESSURE: 152 MMHG | BODY MASS INDEX: 30.5 KG/M2 | DIASTOLIC BLOOD PRESSURE: 84 MMHG

## 2023-07-18 DIAGNOSIS — F12.90 MARIJUANA USE: ICD-10-CM

## 2023-07-18 DIAGNOSIS — R73.03 PREDIABETES: ICD-10-CM

## 2023-07-18 DIAGNOSIS — I10 ESSENTIAL HYPERTENSION: ICD-10-CM

## 2023-07-18 DIAGNOSIS — Z12.5 SCREENING PSA (PROSTATE SPECIFIC ANTIGEN): ICD-10-CM

## 2023-07-18 DIAGNOSIS — Z23 NEED FOR VACCINATION: ICD-10-CM

## 2023-07-18 DIAGNOSIS — E78.2 MIXED HYPERLIPIDEMIA: Primary | ICD-10-CM

## 2023-07-18 PROCEDURE — 1036F TOBACCO NON-USER: CPT | Performed by: NURSE PRACTITIONER

## 2023-07-18 PROCEDURE — G8417 CALC BMI ABV UP PARAM F/U: HCPCS | Performed by: NURSE PRACTITIONER

## 2023-07-18 PROCEDURE — 3017F COLORECTAL CA SCREEN DOC REV: CPT | Performed by: NURSE PRACTITIONER

## 2023-07-18 PROCEDURE — 90750 HZV VACC RECOMBINANT IM: CPT | Performed by: NURSE PRACTITIONER

## 2023-07-18 PROCEDURE — 90471 IMMUNIZATION ADMIN: CPT | Performed by: NURSE PRACTITIONER

## 2023-07-18 PROCEDURE — G8427 DOCREV CUR MEDS BY ELIG CLIN: HCPCS | Performed by: NURSE PRACTITIONER

## 2023-07-18 PROCEDURE — 3077F SYST BP >= 140 MM HG: CPT | Performed by: NURSE PRACTITIONER

## 2023-07-18 PROCEDURE — 99213 OFFICE O/P EST LOW 20 MIN: CPT | Performed by: NURSE PRACTITIONER

## 2023-07-18 PROCEDURE — 3079F DIAST BP 80-89 MM HG: CPT | Performed by: NURSE PRACTITIONER

## 2023-07-18 RX ORDER — LISINOPRIL 30 MG/1
TABLET ORAL
Qty: 90 TABLET | Refills: 0 | Status: SHIPPED | OUTPATIENT
Start: 2023-07-18

## 2023-07-18 RX ORDER — ATORVASTATIN CALCIUM 20 MG/1
20 TABLET, FILM COATED ORAL DAILY
Qty: 90 TABLET | Refills: 1 | Status: SHIPPED | OUTPATIENT
Start: 2023-07-18

## 2023-07-18 SDOH — ECONOMIC STABILITY: INCOME INSECURITY: HOW HARD IS IT FOR YOU TO PAY FOR THE VERY BASICS LIKE FOOD, HOUSING, MEDICAL CARE, AND HEATING?: NOT HARD AT ALL

## 2023-07-18 SDOH — ECONOMIC STABILITY: HOUSING INSECURITY
IN THE LAST 12 MONTHS, WAS THERE A TIME WHEN YOU DID NOT HAVE A STEADY PLACE TO SLEEP OR SLEPT IN A SHELTER (INCLUDING NOW)?: NO

## 2023-07-18 SDOH — ECONOMIC STABILITY: FOOD INSECURITY: WITHIN THE PAST 12 MONTHS, THE FOOD YOU BOUGHT JUST DIDN'T LAST AND YOU DIDN'T HAVE MONEY TO GET MORE.: NEVER TRUE

## 2023-07-18 SDOH — ECONOMIC STABILITY: FOOD INSECURITY: WITHIN THE PAST 12 MONTHS, YOU WORRIED THAT YOUR FOOD WOULD RUN OUT BEFORE YOU GOT MONEY TO BUY MORE.: NEVER TRUE

## 2023-07-18 ASSESSMENT — ENCOUNTER SYMPTOMS
COUGH: 0
WHEEZING: 0
SHORTNESS OF BREATH: 0

## 2023-07-18 NOTE — PROGRESS NOTES
Office Visit   7/18/2023    Subjective:  Chief Complaint   Patient presents with    6 Month Follow-Up    Hypertension    Hyperlipidemia     HPI:   Radha Otero is a 58 y.o. male who presents to the clinic today for follow up. Hyperlipidemia - prescribed Lipitor 20 mg by mouth daily. Denies side effects. Prediabetes -  no formal exercise. Diet is reported as \"not bad. It's improving. \"    HTN-Prescribed lisinopril 30 mg daily -states he stopped this because he didn't know he was supposed to take it daily. Not monitoring BP at home. Asymptomatic. Denies chest pain, palpitations, shortness of breath, trouble breathing, lightheadedness, dizziness or blurred vision. Marijuana abuse- decreasing use overall. Not ready to quit at this time. Review of Systems   Constitutional:  Negative for chills, fatigue, fever and unexpected weight change. Eyes:  Negative for visual disturbance. Respiratory:  Negative for cough, shortness of breath and wheezing. Cardiovascular:  Negative for chest pain and leg swelling. Skin:  Negative for pallor and rash. Neurological:  Negative for dizziness, weakness, light-headedness, numbness and headaches. Psychiatric/Behavioral:  Negative for dysphoric mood, self-injury, sleep disturbance and suicidal ideas. The patient is not nervous/anxious.       No Known Allergies    Current Outpatient Rx   Medication Sig Dispense Refill    lisinopril (PRINIVIL;ZESTRIL) 30 MG tablet TAKE 1 TABLET BY MOUTH EVERY DAY 90 tablet 0    atorvastatin (LIPITOR) 20 MG tablet Take 1 tablet by mouth daily 90 tablet 1    fluticasone (FLONASE) 50 MCG/ACT nasal spray SPRAY 1 SPRAY INTO EACH NOSTRIL EVERY DAY 1 each 1     Patient Active Problem List   Diagnosis    Hypertension    Prediabetes    Primary osteoarthritis of left hip    Marijuana abuse    Mixed hyperlipidemia     Wt Readings from Last 3 Encounters:   07/18/23 189 lb 12.8 oz (86.1 kg)   01/18/23 191 lb 9.6 oz (86.9 kg)   07/14/22 192 lb

## 2023-07-18 NOTE — PATIENT INSTRUCTIONS
Please get your fasting lab work (no food or drink for 10-12 hours prior besides water) completed M-F 730a-430p at our office. [facility] lab has walk-in hours available as well - no appointment is needed. We will call or mychart message you with your results.

## 2023-08-01 DIAGNOSIS — J34.89 SINUS PRESSURE: ICD-10-CM

## 2023-08-03 RX ORDER — FLUTICASONE PROPIONATE 50 MCG
SPRAY, SUSPENSION (ML) NASAL
Refills: 1 | OUTPATIENT
Start: 2023-08-03

## 2023-09-13 ENCOUNTER — OFFICE VISIT (OUTPATIENT)
Dept: INTERNAL MEDICINE CLINIC | Age: 63
End: 2023-09-13
Payer: COMMERCIAL

## 2023-09-13 VITALS
HEART RATE: 70 BPM | HEIGHT: 66 IN | TEMPERATURE: 97.2 F | DIASTOLIC BLOOD PRESSURE: 84 MMHG | WEIGHT: 188.8 LBS | SYSTOLIC BLOOD PRESSURE: 140 MMHG | BODY MASS INDEX: 30.34 KG/M2 | OXYGEN SATURATION: 97 %

## 2023-09-13 DIAGNOSIS — R73.03 PREDIABETES: ICD-10-CM

## 2023-09-13 DIAGNOSIS — E78.2 MIXED HYPERLIPIDEMIA: Primary | ICD-10-CM

## 2023-09-13 DIAGNOSIS — I10 ESSENTIAL HYPERTENSION: ICD-10-CM

## 2023-09-13 PROCEDURE — 3077F SYST BP >= 140 MM HG: CPT | Performed by: NURSE PRACTITIONER

## 2023-09-13 PROCEDURE — 99214 OFFICE O/P EST MOD 30 MIN: CPT | Performed by: NURSE PRACTITIONER

## 2023-09-13 PROCEDURE — 3017F COLORECTAL CA SCREEN DOC REV: CPT | Performed by: NURSE PRACTITIONER

## 2023-09-13 PROCEDURE — G8417 CALC BMI ABV UP PARAM F/U: HCPCS | Performed by: NURSE PRACTITIONER

## 2023-09-13 PROCEDURE — G8427 DOCREV CUR MEDS BY ELIG CLIN: HCPCS | Performed by: NURSE PRACTITIONER

## 2023-09-13 PROCEDURE — 3078F DIAST BP <80 MM HG: CPT | Performed by: NURSE PRACTITIONER

## 2023-09-13 PROCEDURE — 1036F TOBACCO NON-USER: CPT | Performed by: NURSE PRACTITIONER

## 2023-09-13 RX ORDER — LISINOPRIL 40 MG/1
TABLET ORAL
Qty: 30 TABLET | Refills: 1 | Status: SHIPPED | OUTPATIENT
Start: 2023-09-13

## 2023-09-13 ASSESSMENT — ENCOUNTER SYMPTOMS
WHEEZING: 0
COUGH: 0
SHORTNESS OF BREATH: 0

## 2023-09-13 NOTE — PATIENT INSTRUCTIONS
Please get your fasting lab work (no food or drink for 10-12 hours prior besides water) completed M-F 730a-430p at our office. [facility] lab has walk-in hours available as well - no appointment is needed. We will call or mychart message you with your results.

## 2023-09-13 NOTE — PROGRESS NOTES
Office Visit   9/13/2023    Subjective:  Chief Complaint   Patient presents with    Follow-up     No concerns     Hypertension     HPI:  Beckie Chauhan is a 58 y.o. male who presents to the clinic today for follow up. Hyperlipidemia - prescribed Lipitor 20 mg by mouth daily. Denies side effects. Prediabetes -  no formal exercise. Diet is reported as \"shitty. \"     HTN-Prescribed lisinopril 30 mg daily - states he is taking medication as prescribed. Home BP intermittently monitored at home and reports 145/90. Asymptomatic. Denies chest pain, palpitations, shortness of breath, trouble breathing, lightheadedness, dizziness or blurred vision. Review of Systems   Constitutional:  Negative for chills, fatigue and fever. Respiratory:  Negative for cough, shortness of breath and wheezing. Cardiovascular:  Negative for chest pain and palpitations. Skin:  Negative for pallor and rash. Neurological:  Negative for dizziness, weakness, light-headedness, numbness and headaches. No Known Allergies    Current Outpatient Rx   Medication Sig Dispense Refill    lisinopril (PRINIVIL;ZESTRIL) 40 MG tablet TAKE 1 TABLET BY MOUTH EVERY DAY 30 tablet 1    atorvastatin (LIPITOR) 20 MG tablet Take 1 tablet by mouth daily 90 tablet 1    fluticasone (FLONASE) 50 MCG/ACT nasal spray SPRAY 1 SPRAY INTO EACH NOSTRIL EVERY DAY 1 each 1       Patient Active Problem List   Diagnosis    Hypertension    Prediabetes    Primary osteoarthritis of left hip    Marijuana abuse    Mixed hyperlipidemia        Wt Readings from Last 3 Encounters:   09/13/23 188 lb 12.8 oz (85.6 kg)   07/18/23 189 lb 12.8 oz (86.1 kg)   01/18/23 191 lb 9.6 oz (86.9 kg)     BP Readings from Last 3 Encounters:   09/13/23 (!) 140/84   07/18/23 (!) 152/84   01/18/23 122/74     The 10-year ASCVD risk score (Abner PEDROZA, et al., 2019) is: 20.2%    Values used to calculate the score:      Age: 58 years      Sex: Male      Is Non- :  Yes

## 2023-10-11 ENCOUNTER — OFFICE VISIT (OUTPATIENT)
Dept: INTERNAL MEDICINE CLINIC | Age: 63
End: 2023-10-11
Payer: COMMERCIAL

## 2023-10-11 VITALS
HEART RATE: 91 BPM | OXYGEN SATURATION: 97 % | BODY MASS INDEX: 30.92 KG/M2 | DIASTOLIC BLOOD PRESSURE: 74 MMHG | WEIGHT: 192.4 LBS | HEIGHT: 66 IN | SYSTOLIC BLOOD PRESSURE: 126 MMHG

## 2023-10-11 DIAGNOSIS — R73.03 PREDIABETES: ICD-10-CM

## 2023-10-11 DIAGNOSIS — I10 ESSENTIAL HYPERTENSION: ICD-10-CM

## 2023-10-11 DIAGNOSIS — E78.2 MIXED HYPERLIPIDEMIA: Primary | ICD-10-CM

## 2023-10-11 DIAGNOSIS — Z23 NEED FOR VACCINATION: ICD-10-CM

## 2023-10-11 DIAGNOSIS — E78.2 MIXED HYPERLIPIDEMIA: ICD-10-CM

## 2023-10-11 DIAGNOSIS — Z12.5 SCREENING PSA (PROSTATE SPECIFIC ANTIGEN): ICD-10-CM

## 2023-10-11 LAB
ALBUMIN SERPL-MCNC: 4.6 G/DL (ref 3.4–5)
ALBUMIN/GLOB SERPL: 1.8 {RATIO} (ref 1.1–2.2)
ALP SERPL-CCNC: 93 U/L (ref 40–129)
ALT SERPL-CCNC: 17 U/L (ref 10–40)
ANION GAP SERPL CALCULATED.3IONS-SCNC: 10 MMOL/L (ref 3–16)
AST SERPL-CCNC: 13 U/L (ref 15–37)
BILIRUB SERPL-MCNC: 0.6 MG/DL (ref 0–1)
BUN SERPL-MCNC: 6 MG/DL (ref 7–20)
CALCIUM SERPL-MCNC: 9.3 MG/DL (ref 8.3–10.6)
CHLORIDE SERPL-SCNC: 103 MMOL/L (ref 99–110)
CHOLEST SERPL-MCNC: 237 MG/DL (ref 0–199)
CO2 SERPL-SCNC: 30 MMOL/L (ref 21–32)
CREAT SERPL-MCNC: 0.8 MG/DL (ref 0.8–1.3)
GFR SERPLBLD CREATININE-BSD FMLA CKD-EPI: >60 ML/MIN/{1.73_M2}
GLUCOSE SERPL-MCNC: 92 MG/DL (ref 70–99)
HDLC SERPL-MCNC: 31 MG/DL (ref 40–60)
LDL CHOLESTEROL CALCULATED: 180 MG/DL
POTASSIUM SERPL-SCNC: 4.5 MMOL/L (ref 3.5–5.1)
PROT SERPL-MCNC: 7.1 G/DL (ref 6.4–8.2)
PSA SERPL DL<=0.01 NG/ML-MCNC: 4.65 NG/ML (ref 0–4)
SODIUM SERPL-SCNC: 143 MMOL/L (ref 136–145)
TRIGL SERPL-MCNC: 130 MG/DL (ref 0–150)
VLDLC SERPL CALC-MCNC: 26 MG/DL

## 2023-10-11 PROCEDURE — 3078F DIAST BP <80 MM HG: CPT | Performed by: NURSE PRACTITIONER

## 2023-10-11 PROCEDURE — 3017F COLORECTAL CA SCREEN DOC REV: CPT | Performed by: NURSE PRACTITIONER

## 2023-10-11 PROCEDURE — 3074F SYST BP LT 130 MM HG: CPT | Performed by: NURSE PRACTITIONER

## 2023-10-11 PROCEDURE — G8427 DOCREV CUR MEDS BY ELIG CLIN: HCPCS | Performed by: NURSE PRACTITIONER

## 2023-10-11 PROCEDURE — G8482 FLU IMMUNIZE ORDER/ADMIN: HCPCS | Performed by: NURSE PRACTITIONER

## 2023-10-11 PROCEDURE — 90471 IMMUNIZATION ADMIN: CPT | Performed by: NURSE PRACTITIONER

## 2023-10-11 PROCEDURE — G8417 CALC BMI ABV UP PARAM F/U: HCPCS | Performed by: NURSE PRACTITIONER

## 2023-10-11 PROCEDURE — 1036F TOBACCO NON-USER: CPT | Performed by: NURSE PRACTITIONER

## 2023-10-11 PROCEDURE — 90674 CCIIV4 VAC NO PRSV 0.5 ML IM: CPT | Performed by: NURSE PRACTITIONER

## 2023-10-11 PROCEDURE — 99213 OFFICE O/P EST LOW 20 MIN: CPT | Performed by: NURSE PRACTITIONER

## 2023-10-11 RX ORDER — LISINOPRIL 40 MG/1
TABLET ORAL
Qty: 90 TABLET | Refills: 0 | Status: SHIPPED | OUTPATIENT
Start: 2023-10-11

## 2023-10-11 ASSESSMENT — ENCOUNTER SYMPTOMS
WHEEZING: 0
SHORTNESS OF BREATH: 0
COUGH: 0
NAUSEA: 0
VOMITING: 0
ABDOMINAL PAIN: 0
DIARRHEA: 0
CONSTIPATION: 0

## 2023-10-11 NOTE — PATIENT INSTRUCTIONS
Please get your fasting lab work (no food or drink for 10-12 hours prior besides water) completed M-F 730a-430p at our office. [facility] lab has walk-in hours available as well - no appointment is needed. We will call or mychart message you with your results.

## 2023-10-11 NOTE — PROGRESS NOTES
(PRINIVIL;ZESTRIL) 40 MG tablet; TAKE 1 TABLET BY MOUTH EVERY DAY    Need for vaccination  -     Influenza, FLUCELVAX, (age 10 mo+), IM, PF, 0.5 mL - patient education handout provided and reviewed with the pt. Did not complete labs as recommended. Compliance encouraged. Return in about 3 months (around 1/11/2024) for HTN/HLD/preDM f/u, or sooner if needed. Pt will call if symptoms worsen or fail to improve. All questions answered. Pt states no further questions or concerns at this time.    Electronically signed by: CHERYL Trinidad - CNP 10/11/23

## 2023-10-12 LAB
EST. AVERAGE GLUCOSE BLD GHB EST-MCNC: 125.5 MG/DL
HBA1C MFR BLD: 6 %

## 2023-10-13 DIAGNOSIS — R97.20 ELEVATED PSA: Primary | ICD-10-CM

## 2024-01-10 ENCOUNTER — OFFICE VISIT (OUTPATIENT)
Dept: INTERNAL MEDICINE CLINIC | Age: 64
End: 2024-01-10
Payer: COMMERCIAL

## 2024-01-10 VITALS
WEIGHT: 189.8 LBS | OXYGEN SATURATION: 96 % | DIASTOLIC BLOOD PRESSURE: 80 MMHG | HEART RATE: 99 BPM | SYSTOLIC BLOOD PRESSURE: 120 MMHG | BODY MASS INDEX: 30.63 KG/M2 | TEMPERATURE: 97.2 F

## 2024-01-10 DIAGNOSIS — R73.03 PREDIABETES: ICD-10-CM

## 2024-01-10 DIAGNOSIS — J34.89 SINUS PRESSURE: ICD-10-CM

## 2024-01-10 DIAGNOSIS — R97.20 ELEVATED PSA: ICD-10-CM

## 2024-01-10 DIAGNOSIS — I10 ESSENTIAL HYPERTENSION: ICD-10-CM

## 2024-01-10 DIAGNOSIS — Z00.00 ANNUAL PHYSICAL EXAM: Primary | ICD-10-CM

## 2024-01-10 DIAGNOSIS — E78.2 MIXED HYPERLIPIDEMIA: ICD-10-CM

## 2024-01-10 PROCEDURE — G8482 FLU IMMUNIZE ORDER/ADMIN: HCPCS | Performed by: NURSE PRACTITIONER

## 2024-01-10 PROCEDURE — 3074F SYST BP LT 130 MM HG: CPT | Performed by: NURSE PRACTITIONER

## 2024-01-10 PROCEDURE — 3079F DIAST BP 80-89 MM HG: CPT | Performed by: NURSE PRACTITIONER

## 2024-01-10 PROCEDURE — 99396 PREV VISIT EST AGE 40-64: CPT | Performed by: NURSE PRACTITIONER

## 2024-01-10 RX ORDER — LISINOPRIL 40 MG/1
TABLET ORAL
Qty: 90 TABLET | Refills: 1 | Status: SHIPPED | OUTPATIENT
Start: 2024-01-10

## 2024-01-10 RX ORDER — ATORVASTATIN CALCIUM 20 MG/1
20 TABLET, FILM COATED ORAL DAILY
Qty: 90 TABLET | Refills: 1 | Status: SHIPPED | OUTPATIENT
Start: 2024-01-10

## 2024-01-10 RX ORDER — FLUTICASONE PROPIONATE 50 MCG
SPRAY, SUSPENSION (ML) NASAL
Qty: 1 EACH | Refills: 1 | Status: SHIPPED | OUTPATIENT
Start: 2024-01-10

## 2024-01-10 ASSESSMENT — ENCOUNTER SYMPTOMS
SHORTNESS OF BREATH: 0
DIARRHEA: 0
ABDOMINAL PAIN: 0
CONSTIPATION: 0
WHEEZING: 0
NAUSEA: 0
VOMITING: 0
COUGH: 0

## 2024-01-10 ASSESSMENT — PATIENT HEALTH QUESTIONNAIRE - PHQ9
SUM OF ALL RESPONSES TO PHQ QUESTIONS 1-9: 0
SUM OF ALL RESPONSES TO PHQ QUESTIONS 1-9: 0
SUM OF ALL RESPONSES TO PHQ9 QUESTIONS 1 & 2: 0
SUM OF ALL RESPONSES TO PHQ QUESTIONS 1-9: 0
2. FEELING DOWN, DEPRESSED OR HOPELESS: 0
SUM OF ALL RESPONSES TO PHQ QUESTIONS 1-9: 0
1. LITTLE INTEREST OR PLEASURE IN DOING THINGS: 0

## 2024-01-10 NOTE — PROGRESS NOTES
Annual Physical Office Visit  1/10/2024    Subjective:  Chief Complaint   Patient presents with    Hypertension    Hyperlipidemia    Annual Exam     HPI:  Anjum Edgar is a 63 y.o. male who presents to the clinic today for an annual physical.    Hyperlipidemia - prescribed Lipitor 20 mg by mouth daily. Denies side effects.     Prediabetes -  performing formal exercise- \"pushups, situps.\" Diet is reported as \"eh. I am good on days. I am bad on days.\"     HTN-Prescribed lisinopril 40 mg daily - states he is taking medication as prescribed.  Not monitoring BP at home.  Asymptomatic. Denies chest pain, palpitations, shortness of breath, trouble breathing, lightheadedness, dizziness or blurred vision.    Has flonase nasal spray PRN for sinus pressure and would like this prescribed for the spring.    Elevated PSA- referred to urology and pt reports they repeated PSA and it was normal. States he has a f/u in 5 months with urology to repeat/monitor.    Not working. He has family in town. 2 children. For fun, he plays with his grandchildren- 14 of them. His daughter has 9 children.    Review of Systems   Constitutional:  Negative for chills, fatigue and fever.   Respiratory:  Negative for cough, shortness of breath and wheezing.    Cardiovascular:  Negative for chest pain, palpitations and leg swelling.   Gastrointestinal:  Negative for abdominal pain, constipation, diarrhea, nausea and vomiting.   Skin:  Negative for pallor and rash.   Neurological:  Negative for dizziness, weakness, light-headedness, numbness and headaches.   Psychiatric/Behavioral:  Negative for dysphoric mood, self-injury, sleep disturbance and suicidal ideas. The patient is not nervous/anxious.      No Known Allergies    Family History   Problem Relation Age of Onset    High Blood Pressure Mother     High Blood Pressure Father     High Blood Pressure Sister     High Blood Pressure Sister     Heart Attack Neg Hx     Stroke Neg Hx     Prostate Cancer

## 2024-05-14 DIAGNOSIS — J34.89 SINUS PRESSURE: ICD-10-CM

## 2024-05-14 RX ORDER — FLUTICASONE PROPIONATE 50 MCG
SPRAY, SUSPENSION (ML) NASAL
Qty: 1 EACH | Refills: 1 | Status: SHIPPED | OUTPATIENT
Start: 2024-05-14

## 2024-05-21 ENCOUNTER — OFFICE VISIT (OUTPATIENT)
Dept: INTERNAL MEDICINE CLINIC | Age: 64
End: 2024-05-21
Payer: COMMERCIAL

## 2024-05-21 VITALS
SYSTOLIC BLOOD PRESSURE: 122 MMHG | WEIGHT: 185.8 LBS | DIASTOLIC BLOOD PRESSURE: 70 MMHG | BODY MASS INDEX: 29.86 KG/M2 | HEART RATE: 85 BPM | OXYGEN SATURATION: 97 % | HEIGHT: 66 IN

## 2024-05-21 DIAGNOSIS — H65.02 NON-RECURRENT ACUTE SEROUS OTITIS MEDIA OF LEFT EAR: Primary | ICD-10-CM

## 2024-05-21 PROCEDURE — 1036F TOBACCO NON-USER: CPT | Performed by: INTERNAL MEDICINE

## 2024-05-21 PROCEDURE — 3074F SYST BP LT 130 MM HG: CPT | Performed by: INTERNAL MEDICINE

## 2024-05-21 PROCEDURE — 3078F DIAST BP <80 MM HG: CPT | Performed by: INTERNAL MEDICINE

## 2024-05-21 PROCEDURE — G8417 CALC BMI ABV UP PARAM F/U: HCPCS | Performed by: INTERNAL MEDICINE

## 2024-05-21 PROCEDURE — G8427 DOCREV CUR MEDS BY ELIG CLIN: HCPCS | Performed by: INTERNAL MEDICINE

## 2024-05-21 PROCEDURE — 99213 OFFICE O/P EST LOW 20 MIN: CPT | Performed by: INTERNAL MEDICINE

## 2024-05-21 PROCEDURE — 3017F COLORECTAL CA SCREEN DOC REV: CPT | Performed by: INTERNAL MEDICINE

## 2024-05-21 NOTE — PATIENT INSTRUCTIONS
Please use loratadine (Claritin) 10mg daily for your ear fullness. If you are not improving after two weeks please contact the office. Also, if you develop new or worsening symptoms please contact the office.

## 2024-05-21 NOTE — PROGRESS NOTES
Chief Complaint   Patient presents with    Ear Fullness     Feels like his left ear is \"full\"  Pressure      HPI:  Fullness in left ear.  Onset: several weeks ago, worsening  over the past week.  It is not painful.    ROS:  Decreased hearing in the left ear  Neg for fevers    EXAM  /70 (Site: Right Upper Arm, Position: Sitting, Cuff Size: Large Adult)   Pulse 85   Ht 1.676 m (5' 6\")   Wt 84.3 kg (185 lb 12.8 oz)   SpO2 97%   BMI 29.99 kg/m²      GEN WN/WD  ENT: the bilateral EACs are patent; the bilateral EACs are pearly and reflective  NECK: Supple, no masses      A/P  1. Non-recurrent acute serous otitis media of left ear  Symptoms consistent with serous otitis media  Will trial a two week course of loratadine  If ineffective, he may need a referral to ENT. Discussed this with patient.  Patient Instructions   Please use loratadine (Claritin) 10mg daily for your ear fullness. If you are not improving after two weeks please contact the office. Also, if you develop new or worsening symptoms please contact the office.

## 2024-07-09 DIAGNOSIS — I10 ESSENTIAL HYPERTENSION: ICD-10-CM

## 2024-07-09 RX ORDER — LISINOPRIL 40 MG/1
TABLET ORAL
Qty: 30 TABLET | Refills: 0 | Status: SHIPPED | OUTPATIENT
Start: 2024-07-09

## 2024-07-26 ENCOUNTER — OFFICE VISIT (OUTPATIENT)
Dept: INTERNAL MEDICINE CLINIC | Age: 64
End: 2024-07-26
Payer: COMMERCIAL

## 2024-07-26 VITALS
SYSTOLIC BLOOD PRESSURE: 128 MMHG | BODY MASS INDEX: 29.25 KG/M2 | WEIGHT: 182 LBS | DIASTOLIC BLOOD PRESSURE: 76 MMHG | OXYGEN SATURATION: 94 % | HEART RATE: 69 BPM | HEIGHT: 66 IN

## 2024-07-26 DIAGNOSIS — R97.20 ELEVATED PSA: ICD-10-CM

## 2024-07-26 DIAGNOSIS — R73.03 PREDIABETES: ICD-10-CM

## 2024-07-26 DIAGNOSIS — J34.89 SINUS PRESSURE: ICD-10-CM

## 2024-07-26 DIAGNOSIS — E78.2 MIXED HYPERLIPIDEMIA: Primary | ICD-10-CM

## 2024-07-26 DIAGNOSIS — I10 ESSENTIAL HYPERTENSION: ICD-10-CM

## 2024-07-26 LAB — PSA SERPL DL<=0.01 NG/ML-MCNC: 4.72 NG/ML (ref 0–4)

## 2024-07-26 PROCEDURE — 3017F COLORECTAL CA SCREEN DOC REV: CPT | Performed by: NURSE PRACTITIONER

## 2024-07-26 PROCEDURE — G8427 DOCREV CUR MEDS BY ELIG CLIN: HCPCS | Performed by: NURSE PRACTITIONER

## 2024-07-26 PROCEDURE — 3078F DIAST BP <80 MM HG: CPT | Performed by: NURSE PRACTITIONER

## 2024-07-26 PROCEDURE — 1036F TOBACCO NON-USER: CPT | Performed by: NURSE PRACTITIONER

## 2024-07-26 PROCEDURE — 3074F SYST BP LT 130 MM HG: CPT | Performed by: NURSE PRACTITIONER

## 2024-07-26 PROCEDURE — 99214 OFFICE O/P EST MOD 30 MIN: CPT | Performed by: NURSE PRACTITIONER

## 2024-07-26 PROCEDURE — G8417 CALC BMI ABV UP PARAM F/U: HCPCS | Performed by: NURSE PRACTITIONER

## 2024-07-26 PROCEDURE — G2211 COMPLEX E/M VISIT ADD ON: HCPCS | Performed by: NURSE PRACTITIONER

## 2024-07-26 RX ORDER — LISINOPRIL 40 MG/1
40 TABLET ORAL DAILY
Qty: 90 TABLET | Refills: 0 | Status: SHIPPED | OUTPATIENT
Start: 2024-07-26

## 2024-07-26 RX ORDER — ATORVASTATIN CALCIUM 20 MG/1
20 TABLET, FILM COATED ORAL DAILY
Qty: 90 TABLET | Refills: 0 | Status: SHIPPED | OUTPATIENT
Start: 2024-07-26

## 2024-07-26 RX ORDER — FLUTICASONE PROPIONATE 50 MCG
SPRAY, SUSPENSION (ML) NASAL
Qty: 1 EACH | Refills: 1 | Status: SHIPPED | OUTPATIENT
Start: 2024-07-26

## 2024-07-26 ASSESSMENT — ENCOUNTER SYMPTOMS
ABDOMINAL PAIN: 0
WHEEZING: 0
COUGH: 0
SHORTNESS OF BREATH: 0
VOMITING: 0
NAUSEA: 0
DIARRHEA: 0
CONSTIPATION: 0

## 2024-07-26 NOTE — PROGRESS NOTES
Office Visit  7/26/2024    Subjective:  Chief Complaint   Patient presents with    6 Month Follow-Up     HPI:  Anjum Edgar is a 63 y.o. male who presents to the clinic today for follow up.    Hyperlipidemia - prescribed Lipitor 20 mg by mouth daily- states he takes this intermittently. Denies side effects.    Prediabetes -  walking more. Diet is reported as \"eh.\"    HTN-Prescribed lisinopril 40 mg daily - states he is taking medication as prescribed.  Not monitoring BP at home.  Asymptomatic. Denies chest pain, palpitations, shortness of breath, trouble breathing, lightheadedness, dizziness or blurred vision.    Has flonase nasal spray PRN for sinus pressure - well controlled when used per pt.    Elevated PSA- referred to urology and pt reports they repeated PSA and it was normal. Was supposed to f/u and has not rescheduled.    Review of Systems   Constitutional:  Negative for chills, fatigue and fever.   Respiratory:  Negative for cough, shortness of breath and wheezing.    Cardiovascular:  Negative for chest pain.   Gastrointestinal:  Negative for abdominal pain, constipation, diarrhea, nausea and vomiting.   Skin:  Negative for pallor and rash.   Neurological:  Negative for dizziness, weakness, light-headedness, numbness and headaches.   Psychiatric/Behavioral:  Negative for dysphoric mood, self-injury, sleep disturbance and suicidal ideas. The patient is not nervous/anxious.      No Known Allergies    Current Outpatient Rx   Medication Sig Dispense Refill    lisinopril (PRINIVIL;ZESTRIL) 40 MG tablet Take 1 tablet by mouth daily 90 tablet 0    fluticasone (FLONASE) 50 MCG/ACT nasal spray SPRAY 1 SPRAY INTO EACH NOSTRIL EVERY DAY AS NEEDED 1 each 1    atorvastatin (LIPITOR) 20 MG tablet Take 1 tablet by mouth daily 90 tablet 0     Patient Active Problem List   Diagnosis    Hypertension    Prediabetes    Primary osteoarthritis of left hip    Marijuana abuse    Mixed hyperlipidemia     Wt Readings from Last 3

## 2024-10-29 ENCOUNTER — OFFICE VISIT (OUTPATIENT)
Dept: INTERNAL MEDICINE CLINIC | Age: 64
End: 2024-10-29

## 2024-10-29 VITALS
OXYGEN SATURATION: 98 % | SYSTOLIC BLOOD PRESSURE: 126 MMHG | BODY MASS INDEX: 29.86 KG/M2 | DIASTOLIC BLOOD PRESSURE: 84 MMHG | WEIGHT: 185.8 LBS | HEIGHT: 66 IN | HEART RATE: 91 BPM

## 2024-10-29 DIAGNOSIS — Z23 NEED FOR VACCINATION: ICD-10-CM

## 2024-10-29 DIAGNOSIS — R73.03 PREDIABETES: ICD-10-CM

## 2024-10-29 DIAGNOSIS — E78.2 MIXED HYPERLIPIDEMIA: Primary | ICD-10-CM

## 2024-10-29 DIAGNOSIS — I10 ESSENTIAL HYPERTENSION: ICD-10-CM

## 2024-10-29 DIAGNOSIS — R97.20 ELEVATED PSA: ICD-10-CM

## 2024-10-29 DIAGNOSIS — J34.89 SINUS PRESSURE: ICD-10-CM

## 2024-10-29 RX ORDER — LISINOPRIL 40 MG/1
40 TABLET ORAL DAILY
Qty: 90 TABLET | Refills: 0 | Status: SHIPPED | OUTPATIENT
Start: 2024-10-29

## 2024-10-29 RX ORDER — FLUTICASONE PROPIONATE 50 MCG
SPRAY, SUSPENSION (ML) NASAL
Qty: 1 EACH | Refills: 1 | Status: SHIPPED | OUTPATIENT
Start: 2024-10-29

## 2024-10-29 RX ORDER — ATORVASTATIN CALCIUM 20 MG/1
20 TABLET, FILM COATED ORAL DAILY
Qty: 90 TABLET | Refills: 0 | Status: SHIPPED | OUTPATIENT
Start: 2024-10-29

## 2024-10-29 ASSESSMENT — ENCOUNTER SYMPTOMS
NAUSEA: 0
SHORTNESS OF BREATH: 0
COUGH: 0
CONSTIPATION: 0
DIARRHEA: 0
VOMITING: 0
WHEEZING: 0
ABDOMINAL PAIN: 0

## 2024-10-29 NOTE — PATIENT INSTRUCTIONS
Please get your fasting lab work (no food or drink for 10-12 hours prior besides water) completed M-F 730a-430p at our office. [facility] lab has walk-in hours available as well - no appointment is needed. We will call or ONOFFMIX (?????)hart message you with your results.    no

## 2024-10-29 NOTE — PROGRESS NOTES
Office Visit   10/29/2024    Subjective:  Chief Complaint   Patient presents with    3 Month Follow-Up     HPI:   Anjum Edgar is a 63 y.o. male who presents to the clinic today for follow up.    Hyperlipidemia - prescribed Lipitor 20 mg by mouth daily- states he is not taking this medication.     Prediabetes -  walking for exercise. Diet is reported as \"primarily\" healthy. Trying to eat healthier.     HTN-Prescribed lisinopril 40 mg daily - states he is taking medication as prescribed.  Not monitoring BP at home.  Asymptomatic. Denies chest pain, palpitations, shortness of breath, trouble breathing, lightheadedness, dizziness or blurred vision.    Has flonase nasal spray PRN for sinus pressure - well controlled when used per pt.    Elevated PSA- referred to urology and pt reports they repeated PSA and it was normal. Had biopsy this month and goes Friday for results per pt.    Review of Systems   Constitutional:  Negative for chills, fatigue, fever and unexpected weight change.   Eyes:  Negative for visual disturbance.   Respiratory:  Negative for cough, shortness of breath and wheezing.    Cardiovascular:  Negative for chest pain, palpitations and leg swelling.   Gastrointestinal:  Negative for abdominal pain, constipation, diarrhea, nausea and vomiting.   Skin:  Negative for pallor and rash.   Neurological:  Negative for dizziness, weakness, light-headedness, numbness and headaches.   Psychiatric/Behavioral:  Negative for dysphoric mood, self-injury, sleep disturbance and suicidal ideas. The patient is not nervous/anxious.      No Known Allergies    Current Outpatient Rx   Medication Sig Dispense Refill    lisinopril (PRINIVIL;ZESTRIL) 40 MG tablet Take 1 tablet by mouth daily 90 tablet 0    fluticasone (FLONASE) 50 MCG/ACT nasal spray SPRAY 1 SPRAY INTO EACH NOSTRIL EVERY DAY AS NEEDED 1 each 1    atorvastatin (LIPITOR) 20 MG tablet Take 1 tablet by mouth daily 90 tablet 0     Patient Active Problem List

## 2025-01-23 ENCOUNTER — OFFICE VISIT (OUTPATIENT)
Dept: INTERNAL MEDICINE CLINIC | Age: 65
End: 2025-01-23
Payer: COMMERCIAL

## 2025-01-23 VITALS
DIASTOLIC BLOOD PRESSURE: 78 MMHG | WEIGHT: 182 LBS | HEART RATE: 95 BPM | SYSTOLIC BLOOD PRESSURE: 122 MMHG | BODY MASS INDEX: 29.25 KG/M2 | OXYGEN SATURATION: 96 % | HEIGHT: 66 IN

## 2025-01-23 DIAGNOSIS — E78.2 MIXED HYPERLIPIDEMIA: Primary | ICD-10-CM

## 2025-01-23 DIAGNOSIS — I10 PRIMARY HYPERTENSION: ICD-10-CM

## 2025-01-23 DIAGNOSIS — R97.20 ELEVATED PSA: ICD-10-CM

## 2025-01-23 DIAGNOSIS — J06.9 URI, ACUTE: ICD-10-CM

## 2025-01-23 DIAGNOSIS — R73.03 PREDIABETES: ICD-10-CM

## 2025-01-23 PROCEDURE — 3017F COLORECTAL CA SCREEN DOC REV: CPT | Performed by: NURSE PRACTITIONER

## 2025-01-23 PROCEDURE — G8417 CALC BMI ABV UP PARAM F/U: HCPCS | Performed by: NURSE PRACTITIONER

## 2025-01-23 PROCEDURE — 1036F TOBACCO NON-USER: CPT | Performed by: NURSE PRACTITIONER

## 2025-01-23 PROCEDURE — G8427 DOCREV CUR MEDS BY ELIG CLIN: HCPCS | Performed by: NURSE PRACTITIONER

## 2025-01-23 PROCEDURE — 3078F DIAST BP <80 MM HG: CPT | Performed by: NURSE PRACTITIONER

## 2025-01-23 PROCEDURE — 99214 OFFICE O/P EST MOD 30 MIN: CPT | Performed by: NURSE PRACTITIONER

## 2025-01-23 PROCEDURE — 3074F SYST BP LT 130 MM HG: CPT | Performed by: NURSE PRACTITIONER

## 2025-01-23 RX ORDER — TAMSULOSIN HYDROCHLORIDE 0.4 MG/1
CAPSULE ORAL DAILY
COMMUNITY
Start: 2024-11-01

## 2025-01-23 RX ORDER — ATORVASTATIN CALCIUM 20 MG/1
20 TABLET, FILM COATED ORAL DAILY
Qty: 90 TABLET | Refills: 1 | Status: SHIPPED | OUTPATIENT
Start: 2025-01-23

## 2025-01-23 RX ORDER — LISINOPRIL 40 MG/1
40 TABLET ORAL DAILY
Qty: 90 TABLET | Refills: 0 | Status: SHIPPED | OUTPATIENT
Start: 2025-01-23 | End: 2025-01-23

## 2025-01-23 RX ORDER — IBUPROFEN 800 MG/1
TABLET, FILM COATED ORAL
COMMUNITY
Start: 2024-12-06

## 2025-01-23 RX ORDER — ATORVASTATIN CALCIUM 20 MG/1
20 TABLET, FILM COATED ORAL DAILY
Qty: 90 TABLET | Refills: 0 | Status: SHIPPED | OUTPATIENT
Start: 2025-01-23 | End: 2025-01-23

## 2025-01-23 RX ORDER — AZITHROMYCIN 250 MG/1
TABLET, FILM COATED ORAL
Qty: 6 TABLET | Refills: 0 | Status: SHIPPED | OUTPATIENT
Start: 2025-01-23 | End: 2025-02-02

## 2025-01-23 RX ORDER — LORATADINE 10 MG/1
10 TABLET ORAL DAILY
Qty: 30 TABLET | Refills: 0 | Status: SHIPPED | OUTPATIENT
Start: 2025-01-23 | End: 2025-02-22

## 2025-01-23 RX ORDER — LISINOPRIL 40 MG/1
40 TABLET ORAL DAILY
Qty: 90 TABLET | Refills: 1 | Status: SHIPPED | OUTPATIENT
Start: 2025-01-23

## 2025-01-23 SDOH — ECONOMIC STABILITY: FOOD INSECURITY: WITHIN THE PAST 12 MONTHS, THE FOOD YOU BOUGHT JUST DIDN'T LAST AND YOU DIDN'T HAVE MONEY TO GET MORE.: NEVER TRUE

## 2025-01-23 SDOH — ECONOMIC STABILITY: FOOD INSECURITY: WITHIN THE PAST 12 MONTHS, YOU WORRIED THAT YOUR FOOD WOULD RUN OUT BEFORE YOU GOT MONEY TO BUY MORE.: NEVER TRUE

## 2025-01-23 ASSESSMENT — PATIENT HEALTH QUESTIONNAIRE - PHQ9
SUM OF ALL RESPONSES TO PHQ9 QUESTIONS 1 & 2: 0
2. FEELING DOWN, DEPRESSED OR HOPELESS: NOT AT ALL
SUM OF ALL RESPONSES TO PHQ QUESTIONS 1-9: 0
1. LITTLE INTEREST OR PLEASURE IN DOING THINGS: NOT AT ALL
SUM OF ALL RESPONSES TO PHQ QUESTIONS 1-9: 0

## 2025-01-23 ASSESSMENT — ENCOUNTER SYMPTOMS
NAUSEA: 0
SHORTNESS OF BREATH: 0
WHEEZING: 0
ABDOMINAL PAIN: 0
VOMITING: 0
COUGH: 0
CONSTIPATION: 0
SORE THROAT: 0
DIARRHEA: 0

## 2025-01-23 NOTE — PATIENT INSTRUCTIONS
Use flonase Nasal Spray 1 to 2 spays each nostril daily for 7 to 10 days    Take loratidine 10 mg daily for post nasal drainage    Take azithromycin as directed

## 2025-01-23 NOTE — ASSESSMENT & PLAN NOTE
- Chronic.  Stable.  Denies any side effects.  - Continue taking atorvastatin 20 mg daily  -  Due for labs  -  Orders previously placed

## 2025-01-23 NOTE — ASSESSMENT & PLAN NOTE
-  prostate biopsy negative for cancer   -  continue with Urology   -  PSA due July 2025  -   Not taking tamsulosin

## 2025-01-23 NOTE — ASSESSMENT & PLAN NOTE
-  Acute, new problem.    - Symptoms x 1 month   - Will treat with antibiotic  - Z-Braydon as directed  - Loratadine 10 mg nightly  - Flonase nasal spray as directed

## 2025-01-23 NOTE — PROGRESS NOTES
Office Visit   1/23/2025    Subjective:  Chief Complaint   Patient presents with    3 Month Follow-Up     3 month follow up, pt complains of a feeling of intermittent fullness in his throat and left ear x 1 month     HPI:   Anjum Edgar is a 64 y.o. male who presents to the clinic today for follow up.    Hyperlipidemia - prescribed Lipitor 20 mg by mouth daily- states he IS taking this medication. Denies myalgias.  Forgot to get fasting lipids.     Prediabetes -  walking for exercise. Diet is reported as \"primarily\" healthy. States he could do better.  Trying to eat healthier.  Eating lots of meat.  Cut back on junk food.     HTN-Prescribed lisinopril 40 mg daily - states he is taking medication as prescribed.  Not monitoring BP at home. Denies side effects.  Asymptomatic. Denies chest pain, palpitations, shortness of breath, trouble breathing, lightheadedness, dizziness or blurred vision.    Using flonase nasal spray PRN for sinus pressure -has not been using it currently    Elevated PSA-followed by urology.  Had prostate biopsy was normal and no cancer per patient..   Not taking tamsulosin.      Acute concerns:  reports intermittent left ear fullness. Intermittent tinnitus.  Post nasal drainage, cough, nasal congestion off and on for a month.  Has not treated the symptoms.    Review of Systems   Constitutional:  Negative for chills and fever.   HENT:  Positive for congestion, ear pain and postnasal drip. Negative for sore throat.    Eyes:  Negative for visual disturbance.   Respiratory:  Negative for cough, shortness of breath and wheezing.    Cardiovascular:  Negative for chest pain, palpitations and leg swelling.   Gastrointestinal:  Negative for abdominal pain, constipation, diarrhea, nausea and vomiting.   Skin:  Negative for pallor and rash.   Neurological:  Negative for dizziness, weakness, light-headedness, numbness and headaches.     No Known Allergies    Current Outpatient Rx   Medication Sig Dispense

## 2025-06-24 ENCOUNTER — OFFICE VISIT (OUTPATIENT)
Dept: INTERNAL MEDICINE CLINIC | Age: 65
End: 2025-06-24

## 2025-06-24 VITALS
WEIGHT: 187 LBS | OXYGEN SATURATION: 98 % | HEART RATE: 78 BPM | DIASTOLIC BLOOD PRESSURE: 80 MMHG | HEIGHT: 66 IN | SYSTOLIC BLOOD PRESSURE: 120 MMHG | BODY MASS INDEX: 30.05 KG/M2

## 2025-06-24 DIAGNOSIS — R97.20 ELEVATED PSA: ICD-10-CM

## 2025-06-24 DIAGNOSIS — E78.2 MIXED HYPERLIPIDEMIA: ICD-10-CM

## 2025-06-24 DIAGNOSIS — R73.03 PREDIABETES: ICD-10-CM

## 2025-06-24 DIAGNOSIS — I10 PRIMARY HYPERTENSION: ICD-10-CM

## 2025-06-24 DIAGNOSIS — Z00.00 ANNUAL PHYSICAL EXAM: Primary | ICD-10-CM

## 2025-06-24 DIAGNOSIS — J34.89 SINUS PRESSURE: ICD-10-CM

## 2025-06-24 RX ORDER — ATORVASTATIN CALCIUM 20 MG/1
20 TABLET, FILM COATED ORAL DAILY
Qty: 90 TABLET | Refills: 1 | Status: CANCELLED | OUTPATIENT
Start: 2025-06-24

## 2025-06-24 RX ORDER — LISINOPRIL 40 MG/1
40 TABLET ORAL DAILY
Qty: 90 TABLET | Refills: 1 | Status: SHIPPED | OUTPATIENT
Start: 2025-06-24

## 2025-06-24 RX ORDER — FLUTICASONE PROPIONATE 50 MCG
SPRAY, SUSPENSION (ML) NASAL
Qty: 1 EACH | Refills: 1 | Status: SHIPPED | OUTPATIENT
Start: 2025-06-24

## 2025-06-24 RX ORDER — ROSUVASTATIN CALCIUM 10 MG/1
10 TABLET, COATED ORAL NIGHTLY
Qty: 90 TABLET | Refills: 0 | Status: SHIPPED | OUTPATIENT
Start: 2025-06-24

## 2025-06-24 ASSESSMENT — ENCOUNTER SYMPTOMS
DIARRHEA: 0
WHEEZING: 0
NAUSEA: 0
VOMITING: 0
ABDOMINAL PAIN: 0
SHORTNESS OF BREATH: 0
CONSTIPATION: 0
COUGH: 0

## 2025-06-24 ASSESSMENT — PATIENT HEALTH QUESTIONNAIRE - PHQ9
SUM OF ALL RESPONSES TO PHQ QUESTIONS 1-9: 0
1. LITTLE INTEREST OR PLEASURE IN DOING THINGS: NOT AT ALL
2. FEELING DOWN, DEPRESSED OR HOPELESS: NOT AT ALL
SUM OF ALL RESPONSES TO PHQ QUESTIONS 1-9: 0

## 2025-06-24 NOTE — PROGRESS NOTES
Annual Physical Office Visit  6/24/2025    Subjective:  Chief Complaint   Patient presents with    Annual Exam     HPI:  Anjum Edgar is a 64 y.o. male who presents to the clinic today for an annual physical and routine f/u.    Hyperlipidemia - prescribed Lipitor 20 mg by mouth daily- not taking this medication. States he was having leg pain so he stopped this medication and pain resolved.    Prediabetes - not exercising. Diet is reported as healthy.    HTN-Prescribed lisinopril 40 mg daily- taking as prescribed.  Not monitoring BP at home.  Asymptomatic. Denies chest pain, palpitations, shortness of breath, trouble breathing, lightheadedness, dizziness or blurred vision.    Has flonase nasal spray PRN for sinus pressure - well controlled when used per pt.    Elevated PSA- referred to urology and pt reports they repeated PSA and it was normal. Had biopsy and states \"that was fine. They found nothing. There were no problems.\" On flomax through urology. Going to schedule f/u per pt.    Review of Systems   Constitutional:  Negative for chills, fatigue and fever.   Respiratory:  Negative for cough, shortness of breath and wheezing.    Cardiovascular:  Negative for chest pain, palpitations and leg swelling.   Gastrointestinal:  Negative for abdominal pain, constipation, diarrhea, nausea and vomiting.   Skin:  Negative for pallor and rash.   Neurological:  Negative for dizziness, weakness, light-headedness, numbness and headaches.   Psychiatric/Behavioral:  Negative for dysphoric mood, self-injury, sleep disturbance and suicidal ideas. The patient is not nervous/anxious.      No Known Allergies    Family History   Problem Relation Age of Onset    High Blood Pressure Mother     High Blood Pressure Father     High Blood Pressure Sister     High Blood Pressure Sister     Heart Attack Neg Hx     Stroke Neg Hx     Prostate Cancer Neg Hx      Current Outpatient Rx   Medication Sig Dispense Refill    fluticasone (FLONASE) 50

## 2025-06-24 NOTE — PATIENT INSTRUCTIONS
Please get your fasting lab work (no food or drink for 10-12 hours prior besides water) completed M-F [id number] at our office. [facility] lab has walk-in hours available as well - no appointment is needed. We will call or [email or web address] message you with your results.    6 weeks after taking crestor, repeat fasting labs